# Patient Record
Sex: FEMALE | Race: BLACK OR AFRICAN AMERICAN | Employment: FULL TIME | URBAN - METROPOLITAN AREA
[De-identification: names, ages, dates, MRNs, and addresses within clinical notes are randomized per-mention and may not be internally consistent; named-entity substitution may affect disease eponyms.]

---

## 2017-02-03 ENCOUNTER — OFFICE VISIT (OUTPATIENT)
Dept: INTERNAL MEDICINE CLINIC | Age: 37
End: 2017-02-03

## 2017-02-03 VITALS
WEIGHT: 201 LBS | RESPIRATION RATE: 16 BRPM | TEMPERATURE: 98.1 F | OXYGEN SATURATION: 98 % | BODY MASS INDEX: 34.31 KG/M2 | HEIGHT: 64 IN | HEART RATE: 72 BPM | SYSTOLIC BLOOD PRESSURE: 106 MMHG | DIASTOLIC BLOOD PRESSURE: 74 MMHG

## 2017-02-03 DIAGNOSIS — R51.9 HEADACHE, UNSPECIFIED HEADACHE TYPE: Primary | ICD-10-CM

## 2017-02-03 DIAGNOSIS — E66.9 OBESITY (BMI 30.0-34.9): ICD-10-CM

## 2017-02-03 DIAGNOSIS — K58.1 IRRITABLE BOWEL SYNDROME WITH CONSTIPATION: ICD-10-CM

## 2017-02-03 DIAGNOSIS — R53.83 FATIGUE, UNSPECIFIED TYPE: ICD-10-CM

## 2017-02-03 NOTE — PROGRESS NOTES
Ridge Bower is a 40 y.o. female who was seen in clinic today (2/3/2017). Patient was seen with Dr Itz Osuna (R3 at [facility]). Assessment & Plan:  Shakira was seen today for headache and abdominal pain. Diagnoses and all orders for this visit:    Headache, unspecified headache type- differential dx reviewed with the patient, sounds more allergy related. Will treat with restarting prn allergy medications. Will avoid NSAIDs and use only Tylenol. Red flags were reviewed with the patient to RTC or notify me, expected time course for resolution reviewed, instructed to update me in 2 wks. Fatigue, unspecified type- this is a new problem, differential dx reviewed with the patient, no obvious etiology. Will check labs. Will consider getting sleep study   -     METABOLIC PANEL, COMPREHENSIVE  -     CBC WITH AUTOMATED DIFF  -     TSH 3RD GENERATION    Irritable bowel syndrome with constipation- fluctuating, reviewed FODMAT diet again. Reviewed fiber daily. To GI if no changes. Obesity (BMI 30.0-34.9)- stable, needs improvement, discussed the patient's above normal BMI with her. Will check labs. The follow up plan is as follows: I have counseled this patient on diet and exercise regimens, cont to exercise & recommended to start monitoring calorie intake. Follow-up Disposition:  Return if symptoms worsen or fail to improve.       ----------------------------------------------------------------------    Subjective:  Neurological Review  She is here today to talk about headaches & fatigue. This is a new problem. Episodes are occuring daily for the last the last month. The pain is described aching & is bilateral in the frontal area. Associated symptoms include: none. Precipitating factors are: when she gets hungry. She denies a history of recent head injury. Treatments for headaches currently include: OTC NSAID's. Her fatigue has been present for \"a while\". She reports snoring, but no apnea. She feels she is sleeping well at night. Not needing to nap. Prior to Admission medications    Medication Sig Start Date End Date Taking? Authorizing Provider   albuterol (PROVENTIL HFA, VENTOLIN HFA, PROAIR HFA) 90 mcg/actuation inhaler Take 1 Puff by inhalation as needed for Wheezing. 11/11/16  Yes Prince Jhoana MD   montelukast (SINGULAIR) 10 mg tablet TAKE 1 TABLET BY MOUTH EVERY DAY 8/4/16  Yes Prince Jhoana MD   azelastine (ASTELIN) 137 mcg nasal spray 2 Sprays by Both Nostrils route two (2) times a day. Use in each nostril as directed 4/24/15  Yes Prince Jhoana MD   MULTIVIT WITH CALCIUM,IRON,MIN [facility] MULTIPLE VITAMINS PO) Take 1 Tab by mouth daily. Yes Historical Provider   fluticasone (FLONASE) 50 mcg/actuation nasal spray 2 sprays by Both Nostrils route daily. 12/15/14  Yes Prince Jhoana MD   levonorgestrel (MIRENA) 20 mcg/24 hour (5 years) IUD 1 Each by IntraUTERine route once. Yes Historical Provider   loratadine (CLARITIN) 10 mg tablet Take 10 mg by mouth daily. Indications: ALLERGIC RHINITIS   Yes Historical Provider   omeprazole (PRILOSEC) 20 mg capsule Take 1 Cap by mouth daily. 3/7/16   Prince Jhoana MD          No Known Allergies        Review of Systems   Constitutional: Positive for malaise/fatigue. Negative for weight loss. Respiratory: Negative for cough and shortness of breath. Cardiovascular: Negative for chest pain and palpitations. Gastrointestinal: Positive for abdominal pain (cramping) and constipation. Negative for diarrhea, nausea and vomiting. Never started FODMAT diet   Neurological: Positive for headaches. Objective:   Physical Exam   Constitutional: No distress. obese   HENT:   Right Ear: Tympanic membrane is not erythematous and not bulging. No middle ear effusion. Left Ear: Tympanic membrane is not erythematous and not bulging. No middle ear effusion. Nose: No mucosal edema or rhinorrhea.  Right sinus exhibits no maxillary sinus tenderness and no frontal sinus tenderness. Left sinus exhibits no maxillary sinus tenderness and no frontal sinus tenderness. Mouth/Throat: Uvula is midline and mucous membranes are normal. No oropharyngeal exudate or posterior oropharyngeal erythema. Eyes: Conjunctivae are normal. No scleral icterus. Neck: Neck supple. Cardiovascular: Regular rhythm and normal heart sounds. No murmur heard. Pulmonary/Chest: Effort normal and breath sounds normal. She has no wheezes. She has no rales. Abdominal: Bowel sounds are normal. She exhibits no mass. There is no hepatosplenomegaly. There is no tenderness. Lymphadenopathy:     She has no cervical adenopathy. Psychiatric: She has a normal mood and affect. Her behavior is normal.         Visit Vitals    /74    Pulse 72    Temp 98.1 °F (36.7 °C) (Oral)    Resp 16    Ht 5' 4\" (1.626 m)    Wt 201 lb (91.2 kg)    SpO2 98%    BMI 34.5 kg/m2         Disclaimer:  Advised her to call back or return to office if symptoms worsen/change/persist.  Discussed expected course/resolution/complications of diagnosis in detail with patient. Medication risks/benefits/costs/interactions/alternatives discussed with patient. She was given an after visit summary which includes diagnoses, current medications, & vitals. She expressed understanding with the diagnosis and plan.         Liana Johns MD

## 2017-02-04 LAB
ALBUMIN SERPL-MCNC: 4 G/DL (ref 3.5–5.5)
ALBUMIN/GLOB SERPL: 1.7 {RATIO} (ref 1.1–2.5)
ALP SERPL-CCNC: 82 IU/L (ref 39–117)
ALT SERPL-CCNC: 22 IU/L (ref 0–32)
AST SERPL-CCNC: 17 IU/L (ref 0–40)
BASOPHILS # BLD AUTO: 0 X10E3/UL (ref 0–0.2)
BASOPHILS NFR BLD AUTO: 0 %
BILIRUB SERPL-MCNC: 0.6 MG/DL (ref 0–1.2)
BUN SERPL-MCNC: 9 MG/DL (ref 6–20)
BUN/CREAT SERPL: 14 (ref 8–20)
CALCIUM SERPL-MCNC: 9.4 MG/DL (ref 8.7–10.2)
CHLORIDE SERPL-SCNC: 103 MMOL/L (ref 96–106)
CO2 SERPL-SCNC: 28 MMOL/L (ref 18–29)
CREAT SERPL-MCNC: 0.65 MG/DL (ref 0.57–1)
EOSINOPHIL # BLD AUTO: 0.2 X10E3/UL (ref 0–0.4)
EOSINOPHIL NFR BLD AUTO: 3 %
ERYTHROCYTE [DISTWIDTH] IN BLOOD BY AUTOMATED COUNT: 13.3 % (ref 12.3–15.4)
GLOBULIN SER CALC-MCNC: 2.4 G/DL (ref 1.5–4.5)
GLUCOSE SERPL-MCNC: 88 MG/DL (ref 65–99)
HCT VFR BLD AUTO: 39 % (ref 34–46.6)
HGB BLD-MCNC: 12.7 G/DL (ref 11.1–15.9)
IMM GRANULOCYTES # BLD: 0 X10E3/UL (ref 0–0.1)
IMM GRANULOCYTES NFR BLD: 0 %
LYMPHOCYTES # BLD AUTO: 3.7 X10E3/UL (ref 0.7–3.1)
LYMPHOCYTES NFR BLD AUTO: 45 %
MCH RBC QN AUTO: 29.6 PG (ref 26.6–33)
MCHC RBC AUTO-ENTMCNC: 32.6 G/DL (ref 31.5–35.7)
MCV RBC AUTO: 91 FL (ref 79–97)
MONOCYTES # BLD AUTO: 0.8 X10E3/UL (ref 0.1–0.9)
MONOCYTES NFR BLD AUTO: 10 %
NEUTROPHILS # BLD AUTO: 3.5 X10E3/UL (ref 1.4–7)
NEUTROPHILS NFR BLD AUTO: 42 %
PLATELET # BLD AUTO: 293 X10E3/UL (ref 150–379)
POTASSIUM SERPL-SCNC: 4.1 MMOL/L (ref 3.5–5.2)
PROT SERPL-MCNC: 6.4 G/DL (ref 6–8.5)
RBC # BLD AUTO: 4.29 X10E6/UL (ref 3.77–5.28)
SODIUM SERPL-SCNC: 141 MMOL/L (ref 134–144)
TSH SERPL DL<=0.005 MIU/L-ACNC: 1.16 UIU/ML (ref 0.45–4.5)
WBC # BLD AUTO: 8.3 X10E3/UL (ref 3.4–10.8)

## 2017-02-06 NOTE — PROGRESS NOTES
Results released to patient via [employer]. All labs are stable or at goal for her. Anemia resolved. No obvious cause of fatigue.

## 2017-03-05 RX ORDER — MONTELUKAST SODIUM 10 MG/1
TABLET ORAL
Qty: 30 TAB | Refills: 5 | Status: SHIPPED | OUTPATIENT
Start: 2017-03-05 | End: 2017-05-19 | Stop reason: SDUPTHER

## 2017-04-14 DIAGNOSIS — J45.909 UNCOMPLICATED ASTHMA, UNSPECIFIED ASTHMA SEVERITY: ICD-10-CM

## 2017-04-14 DIAGNOSIS — J30.9 ALLERGIC RHINITIS, UNSPECIFIED ALLERGIC RHINITIS TRIGGER, UNSPECIFIED RHINITIS SEASONALITY: ICD-10-CM

## 2017-04-14 RX ORDER — FLUTICASONE PROPIONATE 50 MCG
2 SPRAY, SUSPENSION (ML) NASAL DAILY
Qty: 3 BOTTLE | Refills: 3 | Status: SHIPPED | OUTPATIENT
Start: 2017-04-14 | End: 2017-05-19 | Stop reason: SDUPTHER

## 2017-05-19 DIAGNOSIS — J30.9 ALLERGIC RHINITIS, UNSPECIFIED ALLERGIC RHINITIS TRIGGER, UNSPECIFIED RHINITIS SEASONALITY: ICD-10-CM

## 2017-05-19 DIAGNOSIS — J45.909 UNCOMPLICATED ASTHMA, UNSPECIFIED ASTHMA SEVERITY: ICD-10-CM

## 2017-05-24 RX ORDER — MONTELUKAST SODIUM 10 MG/1
10 TABLET ORAL DAILY
Qty: 30 TAB | Refills: 5 | Status: SHIPPED | COMMUNITY
Start: 2017-05-24 | End: 2017-06-15 | Stop reason: SDUPTHER

## 2017-05-24 RX ORDER — FLUTICASONE PROPIONATE 50 MCG
2 SPRAY, SUSPENSION (ML) NASAL DAILY
Qty: 3 BOTTLE | Refills: 3 | Status: SHIPPED | OUTPATIENT
Start: 2017-05-24 | End: 2018-03-28 | Stop reason: SDUPTHER

## 2017-05-24 NOTE — TELEPHONE ENCOUNTER
From: Maeve Aaron  To: Yen Verde MD  Sent: 5/19/2017 12:26 PM EDT  Subject: Medication Renewal Request    Original authorizing provider: MD Maeve Smith would like a refill of the following medications:  montelukast (SINGULAIR) 10 mg tablet [Kristopher Lima MD]  fluticasone (FLONASE) 50 mcg/actuation nasal spray [Kristopher Lima MD]    Preferred pharmacy: [facility]    Comment:  Requesting 90-day supply scripts for the above medications to be sent to [facility]. My insurance co is giving me a hard time about filling them at [facility] (higher cost). Thank you.

## 2017-06-15 RX ORDER — MONTELUKAST SODIUM 10 MG/1
10 TABLET ORAL DAILY
Qty: 90 TAB | Refills: 0 | Status: SHIPPED | COMMUNITY
Start: 2017-06-15 | End: 2018-03-28 | Stop reason: SDUPTHER

## 2017-07-18 ENCOUNTER — OFFICE VISIT (OUTPATIENT)
Dept: INTERNAL MEDICINE CLINIC | Age: 37
End: 2017-07-18

## 2017-07-18 VITALS
DIASTOLIC BLOOD PRESSURE: 74 MMHG | OXYGEN SATURATION: 99 % | HEART RATE: 64 BPM | RESPIRATION RATE: 12 BRPM | WEIGHT: 203 LBS | SYSTOLIC BLOOD PRESSURE: 104 MMHG | TEMPERATURE: 98.4 F | HEIGHT: 64 IN | BODY MASS INDEX: 34.66 KG/M2

## 2017-07-18 DIAGNOSIS — J45.20 MILD INTERMITTENT ASTHMA WITHOUT COMPLICATION: Primary | ICD-10-CM

## 2017-07-18 DIAGNOSIS — K58.2 IRRITABLE BOWEL SYNDROME WITH BOTH CONSTIPATION AND DIARRHEA: ICD-10-CM

## 2017-07-18 DIAGNOSIS — J30.2 SEASONAL ALLERGIC RHINITIS, UNSPECIFIED ALLERGIC RHINITIS TRIGGER: ICD-10-CM

## 2017-07-18 DIAGNOSIS — E66.09 NON MORBID OBESITY DUE TO EXCESS CALORIES: ICD-10-CM

## 2017-07-18 DIAGNOSIS — K21.9 GASTROESOPHAGEAL REFLUX DISEASE, ESOPHAGITIS PRESENCE NOT SPECIFIED: ICD-10-CM

## 2017-07-18 NOTE — PROGRESS NOTES
Flora Leblanc is a 40 y.o. female who was seen in clinic today (7/18/2017). Assessment & Plan:  Diagnoses and all orders for this visit:    1. Mild intermittent asthma without complication- well controlled, continue current treatment     2. Gastroesophageal reflux disease, esophagitis presence not specified- stable, continue current treatment as long as prn    3. Seasonal allergic rhinitis, unspecified allergic rhinitis trigger- well controlled, continue current treatment, can add back Astelin if gets worse    4. Irritable bowel syndrome with both constipation and diarrhea- stable, continue current treatment     5. Non morbid obesity due to excess calories- stable, needs improvement, I have reviewed/discussed the above normal BMI with the patient. I have recommended the following interventions: encourage exercise and lifestyle education regarding diet. Follow-up Disposition:  Return for Regular follow up.       ----------------------------------------------------------------------    Subjective:  Shakira was seen today for GERD; Asthma; and Allergic Rhinitis    Pulmonary Review  The patient is being seen for asthma & allergies. Symptoms occur: infrequently. Current limitations in activity: none. Coughing when present is described as none. Wheezing when present is described as none. Regimen compliance: using meds regularly (singulair). Frequency of rescue inhaler: has not used since last visit. She is not using Astelin, but using other allergy medications. Slight sore throat, no post nasal drip sensation though. Patient does not smoke cigarettes. She reports her allergy symptoms are improved.       GI Review  Patient complains of GERD and IBS type symptoms. This has been present for years ago, about the same since last visit. Her symptoms include abdominal bloating, fullness after meals, nausea and alternating diarrhea/constipation. She denies melena or brbpr.   Medical therapy currently involves Prilosec (using as needed, will use for a few days then stop, using every 2-3 months).    ----------------------------------------------------------------------      Prior to Admission medications    Medication Sig Start Date End Date Taking? Authorizing Provider   guar gum (BENEFIBER) packet Take  by mouth two (2) times daily as needed. Yes Historical Provider   montelukast (SINGULAIR) 10 mg tablet Take 1 Tab by mouth daily. 6/15/17  Yes Zo Roy MD   fluticasone [facility]) 50 mcg/actuation nasal spray 2 Sprays by Both Nostrils route daily. 5/24/17  Yes Zo Roy MD   albuterol (PROVENTIL HFA, VENTOLIN HFA, PROAIR HFA) 90 mcg/actuation inhaler Take 1 Puff by inhalation as needed for Wheezing. 11/11/16  Yes Zo Roy MD   omeprazole (PRILOSEC) 20 mg capsule Take 1 Cap by mouth daily. 3/7/16  Yes Zo Roy MD   MULTIVIT WITH CALCIUM,IRON,MIN [facility] MULTIPLE VITAMINS PO) Take 1 Tab by mouth daily. Yes Historical Provider   levonorgestrel (MIRENA) 20 mcg/24 hour (5 years) IUD 1 Each by IntraUTERine route once. Yes Historical Provider   loratadine (CLARITIN) 10 mg tablet Take 10 mg by mouth daily. Indications: ALLERGIC RHINITIS   Yes Historical Provider   azelastine (ASTELIN) 137 mcg nasal spray 2 Sprays by Both Nostrils route two (2) times a day. Use in each nostril as directed 4/24/15   Zo Roy MD          No Known Allergies        Review of Systems   Respiratory: Negative for cough and shortness of breath. Cardiovascular: Negative for chest pain and palpitations. Gastrointestinal: Positive for constipation, diarrhea and nausea. Negative for abdominal pain, blood in stool, melena and vomiting. Musculoskeletal: Negative for joint pain and myalgias. Psychiatric/Behavioral: Negative for depression. The patient is not nervous/anxious and does not have insomnia. Objective:   Physical Exam   Constitutional: No distress. Cardiovascular: Regular rhythm and normal heart sounds. No murmur heard. Pulmonary/Chest: Effort normal and breath sounds normal. She has no wheezes. She has no rales. Abdominal: Bowel sounds are normal. She exhibits no mass. There is no hepatosplenomegaly. There is no tenderness. Musculoskeletal: She exhibits no edema. Psychiatric: She has a normal mood and affect. Her behavior is normal.         Visit Vitals    /74    Pulse 64    Temp 98.4 °F (36.9 °C) (Oral)    Resp 12    Ht 5' 4\" (1.626 m)    Wt 203 lb (92.1 kg)    SpO2 99%    BMI 34.84 kg/m2         Disclaimer:  Advised her to call back or return to office if symptoms worsen/change/persist.  Discussed expected course/resolution/complications of diagnosis in detail with patient. Medication risks/benefits/costs/interactions/alternatives discussed with patient. She was given an after visit summary which includes diagnoses, current medications, & vitals. She expressed understanding with the diagnosis and plan.         Nereyda Julien MD

## 2017-08-01 ENCOUNTER — OFFICE VISIT (OUTPATIENT)
Dept: INTERNAL MEDICINE CLINIC | Age: 37
End: 2017-08-01

## 2017-08-01 VITALS
DIASTOLIC BLOOD PRESSURE: 78 MMHG | RESPIRATION RATE: 16 BRPM | WEIGHT: 202.2 LBS | SYSTOLIC BLOOD PRESSURE: 116 MMHG | HEIGHT: 64 IN | TEMPERATURE: 98.2 F | BODY MASS INDEX: 34.52 KG/M2 | OXYGEN SATURATION: 98 % | HEART RATE: 87 BPM

## 2017-08-01 DIAGNOSIS — J06.9 URI WITH COUGH AND CONGESTION: Primary | ICD-10-CM

## 2017-08-01 DIAGNOSIS — J02.9 SORE THROAT: ICD-10-CM

## 2017-08-01 DIAGNOSIS — B37.9 YEAST INFECTION: ICD-10-CM

## 2017-08-01 LAB
S PYO AG THROAT QL: NEGATIVE
VALID INTERNAL CONTROL?: YES

## 2017-08-01 RX ORDER — AZITHROMYCIN 250 MG/1
TABLET, FILM COATED ORAL
Qty: 6 TAB | Refills: 0 | Status: SHIPPED | OUTPATIENT
Start: 2017-08-01 | End: 2017-10-05 | Stop reason: ALTCHOICE

## 2017-08-01 RX ORDER — NYSTATIN AND TRIAMCINOLONE ACETONIDE 100000; 1 [USP'U]/G; MG/G
OINTMENT TOPICAL 2 TIMES DAILY
Qty: 30 G | Refills: 0 | Status: SHIPPED | OUTPATIENT
Start: 2017-08-01 | End: 2017-08-08

## 2017-08-01 RX ORDER — FLUCONAZOLE 150 MG/1
150 TABLET ORAL DAILY
Qty: 1 TAB | Refills: 0 | Status: SHIPPED | OUTPATIENT
Start: 2017-08-01 | End: 2017-10-05 | Stop reason: SDUPTHER

## 2017-08-01 NOTE — PROGRESS NOTES
Chief Complaint   Patient presents with    Sore Throat     1. Have you been to the ER, urgent care clinic since your last visit? Hospitalized since your last visit? No    2. Have you seen or consulted any other health care providers outside of the [address] [place] [address] since your last visit? Include any pap smears or colon screening.  Yes When: 3/2017 [facility] Reason for visit: Routine Follow Up    Patient states sore throat, 6 days

## 2017-08-01 NOTE — PROGRESS NOTES
HISTORY OF PRESENT ILLNESS  Shakira Birmingham is a 40 y.o. female. Cold Symptoms   The current episode started more than 2 days ago (5 days ). The cough is non-productive. There has been no fever. Associated symptoms include headaches (dull frontal ), rhinorrhea (thick green ) and sore throat. Pertinent negatives include no chills, no ear pain, no shortness of breath, no wheezing, no nausea and no vomiting. Treatments tried: Tea and increased flonase, aleve. The treatment provided mild relief. She is not a smoker. Her past medical history is significant for asthma. Her past medical history does not include bronchitis. Daughter has similar sx     Review of Systems   Constitutional: Negative for chills. HENT: Positive for rhinorrhea (thick green ) and sore throat. Negative for ear pain. Respiratory: Negative for shortness of breath and wheezing. Gastrointestinal: Negative for nausea and vomiting. Neurological: Positive for headaches (dull frontal ). Patient Active Problem List    Diagnosis Date Noted    Irritable bowel syndrome with both constipation and diarrhea 11/11/2016    Obesity     Hyperlipidemia 11/11/2011    DDD (degenerative disc disease), lumbar 04/13/2011    Asthma 04/13/2011    GERD (gastroesophageal reflux disease) 04/13/2011    Seasonal allergic rhinitis 04/13/2011       Current Outpatient Prescriptions   Medication Sig Dispense Refill    guar gum (BENEFIBER) packet Take  by mouth two (2) times daily as needed.  montelukast (SINGULAIR) 10 mg tablet Take 1 Tab by mouth daily. 90 Tab 0    fluticasone (FLONASE) 50 mcg/actuation nasal spray 2 Sprays by Both Nostrils route daily. 3 Bottle 3    albuterol (PROVENTIL HFA, VENTOLIN HFA, PROAIR HFA) 90 mcg/actuation inhaler Take 1 Puff by inhalation as needed for Wheezing. 1 Inhaler 0    omeprazole (PRILOSEC) 20 mg capsule Take 1 Cap by mouth daily.  30 Cap 1    MULTIVIT WITH CALCIUM,IRON,MIN [facility] MULTIPLE VITAMINS PO) Take 1 Tab by mouth daily.  levonorgestrel (MIRENA) 20 mcg/24 hour (5 years) IUD 1 Each by IntraUTERine route once.  loratadine (CLARITIN) 10 mg tablet Take 10 mg by mouth daily. Indications: ALLERGIC RHINITIS         No Known Allergies   Visit Vitals    /78 (BP 1 Location: Right arm, BP Patient Position: Sitting)    Pulse 87    Temp 98.2 °F (36.8 °C) (Oral)    Resp 16    Ht 5' 4\" (1.626 m)    Wt 202 lb 3.2 oz (91.7 kg)    SpO2 98%    BMI 34.71 kg/m2         Physical Exam   Constitutional: She is oriented to person, place, and time. She appears well-developed. No distress. HENT:   Nose: Mucosal edema present. No rhinorrhea or septal deviation. Right sinus exhibits no maxillary sinus tenderness and no frontal sinus tenderness. Left sinus exhibits no maxillary sinus tenderness and no frontal sinus tenderness. Mouth/Throat: Posterior oropharyngeal edema and posterior oropharyngeal erythema present. No oropharyngeal exudate. Eyes: Conjunctivae are normal.   Neck: Neck supple. Cardiovascular: Normal rate, regular rhythm and normal heart sounds. Pulmonary/Chest: Effort normal and breath sounds normal. No respiratory distress. She has no wheezes. She has no rales. She exhibits no tenderness. Lymphadenopathy:     She has cervical adenopathy. Neurological: She is alert and oriented to person, place, and time. Psychiatric: She has a normal mood and affect. Recent Results (from the past 12 hour(s))   AMB POC RAPID STREP A    Collection Time: 08/01/17  4:00 PM   Result Value Ref Range    VALID INTERNAL CONTROL POC Yes     Group A Strep Ag Negative Negative       ASSESSMENT and PLAN  Diagnoses and all orders for this visit:    1. URI with cough and congestion- 5 days of s/s with purulent drainage. Likely bacteria   -     azithromycin (ZITHROMAX) 250 mg tablet; Day 1 take 2 tabs by mouth; Days 2-5 take one tab by mouth a day    2. Sore throat  -     AMB POC RAPID STREP A    3.  Yeast infection- given for treatment after abx  -     fluconazole (DIFLUCAN) 150 mg tablet; Take 1 Tab by mouth daily for 1 day. Start after antibiotics if yeast infection present  -     nystatin-triamcinolone (MYCOLOG) 100,000-0.1 unit/gram-% ointment; Apply  to affected area two (2) times a day for 7 days. Follow-up Disposition:  Return if symptoms worsen or fail to improve. Medication risks/benefits/costs/interactions/alternatives discussed with patient. Danielle Chica  was given an after visit summary which includes diagnoses, current medications, & vitals. she expressed understanding with the diagnosis and plan.

## 2017-08-01 NOTE — PATIENT INSTRUCTIONS
It was a pleasure to see you! As discussed:    Upper respiratory tract infection (URI)  You have a bacterial URI. Take the antibiotics as prescribed. -Use Flonase 1-2 sprays per nostril once a day  -Use nasal saline spray 3-4 times/day BEFORE Flonase  -Start taking a non sedating antihistamine such as Claritin, Allegra or Zyrtec (generic is fine)  For your  Symptoms:  -Sore throat: Cepacol or similar lozenges, Salt water gargles  -Itching: Thick creams/ lotions; Nonsedating antihistamine such as Allegra, Zyrtec, or Claritin (generic is fine)  -Pain/ Aches: You can use Ibuprofen (no more than 2400 mg/day) or Aleve (no more than 880mg/ day) as needed. Do not use any other NSAIDs while on this medication. Do not use NSAIDs if you have high blood pressure or history of ulcers or abnormal bleeding. OR   -Take Tylenol as needed for headache and body aches (every 4-8 hours, do not use more than 3000mg in one day)       Upper Respiratory Infection (Cold): Care Instructions  Your Care Instructions    An upper respiratory infection, or URI, is an infection of the nose, sinuses, or throat. URIs are spread by coughs, sneezes, and direct contact. The common cold is the most frequent kind of URI. The flu and sinus infections are other kinds of URIs. Almost all URIs are caused by viruses. Antibiotics won't cure them. But you can treat most infections with home care. This may include drinking lots of fluids and taking over-the-counter pain medicine. You will probably feel better in 4 to 10 days. The doctor has checked you carefully, but problems can develop later. If you notice any problems or new symptoms, get medical treatment right away. Follow-up care is a key part of your treatment and safety. Be sure to make and go to all appointments, and call your doctor if you are having problems. It's also a good idea to know your test results and keep a list of the medicines you take.   How can you care for yourself at home?  · To prevent dehydration, drink plenty of fluids, enough so that your urine is light yellow or clear like water. Choose water and other caffeine-free clear liquids until you feel better. If you have kidney, heart, or liver disease and have to limit fluids, talk with your doctor before you increase the amount of fluids you drink. · Take an over-the-counter pain medicine, such as acetaminophen (Tylenol), ibuprofen (Advil, Motrin), or naproxen (Aleve). Read and follow all instructions on the label. · Before you use cough and cold medicines, check the label. These medicines may not be safe for young children or for people with certain health problems. · Be careful when taking over-the-counter cold or flu medicines and Tylenol at the same time. Many of these medicines have acetaminophen, which is Tylenol. Read the labels to make sure that you are not taking more than the recommended dose. Too much acetaminophen (Tylenol) can be harmful. · Get plenty of rest.  · Do not smoke or allow others to smoke around you. If you need help quitting, talk to your doctor about stop-smoking programs and medicines. These can increase your chances of quitting for good. When should you call for help? Call 911 anytime you think you may need emergency care. For example, call if:  · You have severe trouble breathing. Call your doctor now or seek immediate medical care if:  · You seem to be getting much sicker. · You have new or worse trouble breathing. · You have a new or higher fever. · You have a new rash. Watch closely for changes in your health, and be sure to contact your doctor if:  · You have a new symptom, such as a sore throat, an earache, or sinus pain. · You cough more deeply or more often, especially if you notice more mucus or a change in the color of your mucus. · You do not get better as expected. Where can you learn more? Go to http://flavia-[employer].info/.   Enter B833 in the search box to learn more about \"Upper Respiratory Infection (Cold): Care Instructions. \"  Current as of: March 25, 2017  Content Version: 11.3  © 8807-3008 [employer]. Care instructions adapted under license by [employer] (which disclaims liability or warranty for this information). If you have questions about a medical condition or this instruction, always ask your healthcare professional. [place] [address] any warranty or liability for your use of this information.

## 2017-10-04 ENCOUNTER — TELEPHONE (OUTPATIENT)
Dept: INTERNAL MEDICINE CLINIC | Age: 37
End: 2017-10-04

## 2017-10-04 DIAGNOSIS — B37.9 YEAST INFECTION: ICD-10-CM

## 2017-10-05 RX ORDER — FLUCONAZOLE 150 MG/1
150 TABLET ORAL DAILY
Qty: 1 TAB | Refills: 0 | Status: SHIPPED | OUTPATIENT
Start: 2017-10-05 | End: 2017-10-06

## 2017-10-05 NOTE — TELEPHONE ENCOUNTER
Pt states she is having another yeast infection and is requesting the Fluconazole for the yeast infection that Dr Howard Mota prescribed for her before.

## 2017-10-06 NOTE — TELEPHONE ENCOUNTER
Called pt and informed her medication for yeast infection has been filled and if pt has again will have to follow up with obgyn. Left pt this detailed message on voicemail.

## 2018-03-28 ENCOUNTER — OFFICE VISIT (OUTPATIENT)
Dept: INTERNAL MEDICINE CLINIC | Age: 38
End: 2018-03-28

## 2018-03-28 VITALS
DIASTOLIC BLOOD PRESSURE: 84 MMHG | HEART RATE: 64 BPM | RESPIRATION RATE: 14 BRPM | BODY MASS INDEX: 35.17 KG/M2 | HEIGHT: 64 IN | WEIGHT: 206 LBS | SYSTOLIC BLOOD PRESSURE: 114 MMHG | TEMPERATURE: 99.2 F | OXYGEN SATURATION: 99 %

## 2018-03-28 DIAGNOSIS — K21.9 GASTROESOPHAGEAL REFLUX DISEASE WITHOUT ESOPHAGITIS: ICD-10-CM

## 2018-03-28 DIAGNOSIS — J30.2 CHRONIC SEASONAL ALLERGIC RHINITIS, UNSPECIFIED TRIGGER: ICD-10-CM

## 2018-03-28 DIAGNOSIS — M54.6 ACUTE MIDLINE THORACIC BACK PAIN: Primary | ICD-10-CM

## 2018-03-28 DIAGNOSIS — E66.01 SEVERE OBESITY (BMI 35.0-39.9) WITH COMORBIDITY (HCC): ICD-10-CM

## 2018-03-28 RX ORDER — OMEPRAZOLE 20 MG/1
20 CAPSULE, DELAYED RELEASE ORAL DAILY
Qty: 90 CAP | Refills: 1 | Status: CANCELLED | COMMUNITY
Start: 2018-03-28

## 2018-03-28 RX ORDER — ALBUTEROL SULFATE 90 UG/1
1 AEROSOL, METERED RESPIRATORY (INHALATION) AS NEEDED
Qty: 3 INHALER | Refills: 1 | Status: SHIPPED | COMMUNITY
Start: 2018-03-28

## 2018-03-28 RX ORDER — FLUTICASONE PROPIONATE 50 MCG
2 SPRAY, SUSPENSION (ML) NASAL DAILY
Qty: 3 BOTTLE | Refills: 3 | Status: SHIPPED | COMMUNITY
Start: 2018-03-28

## 2018-03-28 RX ORDER — MONTELUKAST SODIUM 10 MG/1
10 TABLET ORAL DAILY
Qty: 90 TAB | Refills: 3 | Status: SHIPPED | COMMUNITY
Start: 2018-03-28

## 2018-03-28 NOTE — PATIENT INSTRUCTIONS
Heat: apply heating pad 10-15 minutes at a time 3-4 times per day    Medications:  Ibuprofen/Advil: 200mg (1-3 tabs every 4-6 hours, take with food)        OR  Naproxen/Aleve: 220mg (1-2 tabs every 12 hours, take with food)     DO NOT TAKE ALONG WITH MELOXICAM!!    *Can not take these medications together. *You can take Tylenol 500mg (1 tabs every 6 hours, max dose of acetaminophen in 24 hrs is 3,000mg) with either Advil or Aleve    Stretching:  Start stretching/exercises (see below). Try to do this 1-2 times per day as tolerated. Healthy Upper Back: Exercises  Your Care Instructions  Here are some examples of exercises for your upper back. Start each exercise slowly. Ease off the exercise if you start to have pain. Your doctor or physical therapist will tell you when you can start these exercises and which ones will work best for you. How to do the exercises  Lower neck and upper back stretch    1. Stretch your arms out in front of your body. Clasp one hand on top of your other hand. 2. Gently reach out so that you feel your shoulder blades stretching away from each other. 3. Gently bend your head forward. 4. Hold for 15 to 30 seconds. 5. Repeat 2 to 4 times. Midback stretch    If you have knee pain, do not do this exercise. 1. Kneel on the floor, and sit back on your ankles. 2. Lean forward, place your hands on the floor, and stretch your arms out in front of you. Rest your head between your arms. 3. Gently push your chest toward the floor, reaching as far in front of you as possible. 4. Hold for 15 to 30 seconds. 5. Repeat 2 to 4 times. Shoulder rolls    1. Sit comfortably with your feet shoulder-width apart. You can also do this exercise while standing. 2. Roll your shoulders up, then back, and then down in a smooth, circular motion. 3. Repeat 2 to 4 times. Wall push-up    1. Stand against a wall with your feet about 12 to 24 inches back from the wall.  If you feel any pain when you do this exercise, stand closer to the wall. 2. Place your hands on the wall slightly wider apart than your shoulders, and lean forward. 3. Gently lean your body toward the wall. Then push back to your starting position. Keep the motion smooth and controlled. 4. Repeat 8 to 12 times. Resisted shoulder blade squeeze    For this exercise, you will need elastic exercise material, such as surgical tubing or Thera-Band. 1. Sit or stand, holding the band in both hands in front of you. Keep your elbows close to your sides, bent at a 90-degree angle. Your palms should face up. 2. Squeeze your shoulder blades together, and move your arms to the outside, stretching the band. Be sure to keep your elbows at your sides while you do this. 3. Relax. 4. Repeat 8 to 12 times. Resisted rows    For this exercise, you will need elastic exercise material, such as surgical tubing or Thera-Band. 1. Put the band around a solid object, such as a bedpost, at about waist level. Hold one end of the band in each hand. 2. With your elbows at your sides and bent to 90 degrees, pull the band back to move your shoulder blades toward each other. Return to the starting position. 3. Repeat 8 to 12 times. Follow-up care is a key part of your treatment and safety. Be sure to make and go to all appointments, and call your doctor if you are having problems. It's also a good idea to know your test results and keep a list of the medicines you take. Where can you learn more? Go to http://flavia-boston.info/. Enter V017 in the search box to learn more about \"Healthy Upper Back: Exercises. \"  Current as of: March 21, 2017  Content Version: 11.4  © 3895-7698 [employer]. Care instructions adapted under license by [employer] (which disclaims liability or warranty for this information).  If you have questions about a medical condition or this instruction, always ask your healthcare professional. [employer] disclaims any warranty or liability for your use of this information.

## 2018-03-28 NOTE — PROGRESS NOTES
Ayana Huston is a 41 y.o. female who was seen in clinic today (3/28/2018). Assessment & Plan:   Diagnoses and all orders for this visit:    1. Acute midline thoracic back pain- this is an acute on chronic problem, symptoms are: not changed, differential dx reviewed with the patient, favor muscular. Reassured does not sound cardiac, pulmonary, or GI related. Will treat with: heat, rest, stretching. Reviewed different NSAIDs as she thinks OTC meds work better then Rx. Reviewed when to see specialist.  See AVS, Red flags were reviewed with the patient to RTC or notify me, expected time course for resolution reviewed. 2. Chronic seasonal allergic rhinitis, unspecified trigger- stable, continue current treatment   -     montelukast (SINGULAIR) 10 mg tablet; Take 1 Tab by mouth daily. -     albuterol (PROVENTIL HFA, VENTOLIN HFA, PROAIR HFA) 90 mcg/actuation inhaler; Take 1 Puff by inhalation as needed for Wheezing.  -     fluticasone (FLONASE) 50 mcg/actuation nasal spray; 2 Sprays by Both Nostrils route daily. 3. Gastroesophageal reflux disease without esophagitis- well controlled, continue to monitor diet and reviewed when to consider restarting    4. Severe obesity (BMI 35.0-39. 9) with comorbidity ([address]- stable, needs improvement, I have reviewed/discussed the above normal BMI with the patient. I have recommended the following interventions: encourage exercise and lifestyle education regarding diet. Follow-up Disposition:  Return if symptoms worsen or fail to improve. Subjective:   Shakira was seen today for Asthma and Back Pain      She has Allergic Rhinitis that be consistant throughout the year. Current symptoms: nothing. Triggers: no triggers. She reports: taking medications as instructed, no medication side effects noted. No recent use of albuterol. She presents do to pain of her mid back pain that is secondary to no known injury and started 30 days ago.   She reports it is worse since last week, attributes to flying (either the seats or carrying the luggage). Back has been a chronic issue, has been on/off, but mostly doing okau until the last 1 month. She describes the pain as aching. It is constant but fluctuating in intensity. The pain radiates: no where. Exacerbating factors identifiable by patient are recumbency. She has tried ibuprofen until recently. She [facility] the [facility]. She reports getting a shot, a muscle relaxer (flexeril), and mobic 15mg. These have helped slightly, but she would expect more at this time. Prior to Admission medications    Medication Sig Start Date End Date Taking? Authorizing Provider   guar gum (BENEFIBER) packet Take  by mouth two (2) times daily as needed. Yes Historical Provider   montelukast (SINGULAIR) 10 mg tablet Take 1 Tab by mouth daily. 6/15/17  Yes Shereen Camacho MD   fluticasone [facility]) 50 mcg/actuation nasal spray 2 Sprays by Both Nostrils route daily. 5/24/17  Yes Shereen Camacho MD   omeprazole (PRILOSEC) 20 mg capsule Take 1 Cap by mouth daily. Patient taking differently: Take 20 mg by mouth daily as needed. 3/7/16  Yes Shereen Camacho MD   MULTIVIT WITH CALCIUM,IRON,MIN [facility] MULTIPLE VITAMINS PO) Take 1 Tab by mouth daily. Yes Historical Provider   levonorgestrel (MIRENA) 20 mcg/24 hour (5 years) IUD 1 Each by IntraUTERine route once. Yes Historical Provider   loratadine (CLARITIN) 10 mg tablet Take 10 mg by mouth daily. Indications: ALLERGIC RHINITIS   Yes Historical Provider   albuterol (PROVENTIL HFA, VENTOLIN HFA, PROAIR HFA) 90 mcg/actuation inhaler Take 1 Puff by inhalation as needed for Wheezing. 11/11/16   Shereen Camacho MD          No Known Allergies        Review of Systems   Constitutional: Negative for malaise/fatigue and weight loss. Respiratory: Negative for cough and shortness of breath.     Cardiovascular: Negative for chest pain, palpitations and leg swelling. Gastrointestinal: Negative for abdominal pain, constipation, diarrhea, heartburn, nausea and vomiting. Genitourinary: Negative for frequency. Musculoskeletal: Positive for back pain and myalgias. Negative for joint pain and neck pain. Skin: Negative for rash. Neurological: Negative for tingling, sensory change, focal weakness and headaches. Objective:   Physical Exam   Constitutional: No distress. Cardiovascular: Regular rhythm and normal heart sounds. No murmur heard. Pulmonary/Chest: Effort normal and breath sounds normal. She has no wheezes. She has no rales. Abdominal: Bowel sounds are normal. She exhibits no mass. There is no hepatosplenomegaly. There is no tenderness. Musculoskeletal:        Cervical back: She exhibits normal range of motion, no tenderness and no bony tenderness. Thoracic back: She exhibits tenderness. She exhibits normal range of motion (but increased pain on the L side w/ rotation to the R), no bony tenderness, no pain and no spasm. Back:    Skin: No rash noted. Psychiatric: She has a normal mood and affect. Visit Vitals    /84    Pulse 64    Temp 99.2 °F (37.3 °C) (Oral)    Resp 14    Ht 5' 4\" (1.626 m)    Wt 206 lb (93.4 kg)    SpO2 99%    BMI 35.36 kg/m2         Disclaimer:  Advised her to call back or return to office if symptoms worsen/change/persist.  Discussed expected course/resolution/complications of diagnosis in detail with patient. Medication risks/benefits/costs/interactions/alternatives discussed with patient. She was given an after visit summary which includes diagnoses, current medications, & vitals. She expressed understanding with the diagnosis and plan. Aspects of this note may have been generated using voice recognition software. Despite editing, there may be some syntax errors.        Lainey Okeefe MD

## 2018-03-28 NOTE — MR AVS SNAPSHOT
[address] [facility] [address] 
[phone] Patient: Jaron Marc MRN: [id number] TEF:[id number] Visit Information Date & Time Provider Department Dept. Phone Encounter #  
 3/28/2018  4:30 PM Diana Connor[facility] [phone] [id number] Follow-up Instructions Return if symptoms worsen or fail to improve. Your Appointments 7/6/2018  2:30 PM  
PHYSICAL with Diana Connor MD  
[facility] Internal Medicine [facility]) Appt Note: cpe  
 [address] [facility] [address]  
[address] [id number] [facility] [address] Upcoming Health Maintenance Date Due  
 PAP AKA CERVICAL CYTOLOGY 3/1/2017 DTaP/Tdap/Td series (2 - Td) 8/18/2020 Allergies as of 3/28/2018  Review Complete On: 3/28/2018 By: Diana Connor MD  
 No Known Allergies Current Immunizations  Reviewed on 3/28/2018 Name Date Hepatitis B Vaccine 11/3/2006 Influenza Vaccine 10/31/2017, 10/10/2016, 11/5/2015, 10/28/2014, 10/1/2013 MMR Vaccine 7/31/2000 PPD 4/13/2010 TDAP Vaccine 8/18/2010 Reviewed by Enrique Smith RN on 3/28/2018 at  4:30 PM  
You Were Diagnosed With   
  
 Codes Comments Acute midline thoracic back pain    -  Primary ICD-10-CM: M54.6 ICD-9-CM: 724.1 Gastroesophageal reflux disease without esophagitis     ICD-10-CM: K21.9 ICD-9-CM: 530.81 Chronic seasonal allergic rhinitis, unspecified trigger     ICD-10-CM: J30.2 ICD-9-CM: 477.8 Vitals BP Pulse Temp Resp Height(growth percentile) Weight(growth percentile) 114/84 64 99.2 °F (37.3 °C) (Oral) 14 5' 4\" (1.626 m) 206 lb (93.4 kg) SpO2 BMI OB Status Smoking Status 99% 35.36 kg/m2 IUD Never Smoker Vitals History BMI and BSA Data  Body Mass Index Body Surface Area  
 35.36 kg/m 2 2.05 m 2  
  
  
 Preferred Pharmacy Pharmacy Name Phone [facility] [phone] Your Updated Medication List  
  
   
This list is accurate as of 3/28/18  4:59 PM.  Always use your most recent med list.  
  
  
  
  
 albuterol 90 mcg/actuation inhaler Commonly known as:  PROVENTIL HFA, VENTOLIN HFA, PROAIR HFA Take 1 Puff by inhalation as needed for Wheezing. CLARITIN 10 mg tablet Generic drug:  loratadine Take 10 mg by mouth daily. Indications: ALLERGIC RHINITIS  
  
 fluticasone 50 mcg/actuation nasal spray Commonly known as:  Nathan Speller 2 Sprays by Both Nostrils route daily. guar gum packet Commonly known as:  Micaela Anibal Take  by mouth two (2) times daily as needed. MIRENA 20 mcg/24 hr (5 years) Iud  
Generic drug:  levonorgestrel 1 Each by IntraUTERine route once. montelukast 10 mg tablet Commonly known as:  SINGULAIR Take 1 Tab by mouth daily. omeprazole 20 mg capsule Commonly known as:  PRILOSEC Take 1 Cap by mouth daily. WOMEN'S MULTIPLE VITAMINS PO Take 1 Tab by mouth daily. Prescriptions Sent to Mail Order Refills  
 montelukast (SINGULAIR) 10 mg tablet 3 Sig: Take 1 Tab by mouth daily. Class: Mail Order Pharmacy: [address], [address] Ph #: [phone] Route: Oral  
 albuterol (PROVENTIL HFA, VENTOLIN HFA, PROAIR HFA) 90 mcg/actuation inhaler 1 Sig: Take 1 Puff by inhalation as needed for Wheezing. Class: Mail Order Pharmacy: [address], [address] Ph #: [phone] Route: Inhalation  
 fluticasone (FLONASE) 50 mcg/actuation nasal spray 3 Si Sprays by Both Nostrils route daily. Class: Mail Order Pharmacy: [address], [address] Ph #: [phone] Route: Both Nostrils Follow-up Instructions Return if symptoms worsen or fail to improve. Patient Instructions Heat: apply heating pad 10-15 minutes at a time 3-4 times per day Medications: 
Ibuprofen/Advil: 200mg (1-3 tabs every 4-6 hours, take with food) OR Naproxen/Aleve: 220mg (1-2 tabs every 12 hours, take with food) DO NOT TAKE ALONG WITH MELOXICAM!! 
 
*Can not take these medications together. *You can take Tylenol 500mg (1 tabs every 6 hours, max dose of acetaminophen in 24 hrs is 3,000mg) with either Advil or Aleve Stretching: 
Start stretching/exercises (see below). Try to do this 1-2 times per day as tolerated. Healthy Upper Back: Exercises Your Care Instructions Here are some examples of exercises for your upper back. Start each exercise slowly. Ease off the exercise if you start to have pain. Your doctor or physical therapist will tell you when you can start these exercises and which ones will work best for you. How to do the exercises Lower neck and upper back stretch 1. Stretch your arms out in front of your body. Clasp one hand on top of your other hand. 2. Gently reach out so that you feel your shoulder blades stretching away from each other. 3. Gently bend your head forward. 4. Hold for 15 to 30 seconds. 5. Repeat 2 to 4 times. Midback stretch If you have knee pain, do not do this exercise. 1. Kneel on the floor, and sit back on your ankles. 2. Lean forward, place your hands on the floor, and stretch your arms out in front of you. Rest your head between your arms. 3. Gently push your chest toward the floor, reaching as far in front of you as possible. 4. Hold for 15 to 30 seconds. 5. Repeat 2 to 4 times. Shoulder rolls 1. Sit comfortably with your feet shoulder-width apart. You can also do this exercise while standing. 2. Roll your shoulders up, then back, and then down in a smooth, circular motion. 3. Repeat 2 to 4 times. Wall push-up 1. Stand against a wall with your feet about 12 to 24 inches back from the wall. If you feel any pain when you do this exercise, stand closer to the wall. 2. Place your hands on the wall slightly wider apart than your shoulders, and lean forward. 3. Gently lean your body toward the wall. Then push back to your starting position. Keep the motion smooth and controlled. 4. Repeat 8 to 12 times. Resisted shoulder blade squeeze For this exercise, you will need elastic exercise material, such as surgical tubing or Thera-Band. 1. Sit or stand, holding the band in both hands in front of you. Keep your elbows close to your sides, bent at a 90-degree angle. Your palms should face up. 2. Squeeze your shoulder blades together, and move your arms to the outside, stretching the band. Be sure to keep your elbows at your sides while you do this. 3. Relax. 4. Repeat 8 to 12 times. Resisted rows For this exercise, you will need elastic exercise material, such as surgical tubing or Thera-Band. 1. Put the band around a solid object, such as a bedpost, at about waist level. Hold one end of the band in each hand. 2. With your elbows at your sides and bent to 90 degrees, pull the band back to move your shoulder blades toward each other. Return to the starting position. 3. Repeat 8 to 12 times. Follow-up care is a key part of your treatment and safety. Be sure to make and go to all appointments, and call your doctor if you are having problems. It's also a good idea to know your test results and keep a list of the medicines you take. Where can you learn more? Go to http://flavia-boston.info/. Enter V447 in the search box to learn more about \"Healthy Upper Back: Exercises. \" Current as of: March 21, 2017 Content Version: 11.4 © 0699-2353 [employer].  Care instructions adapted under license by [employer] (which disclaims liability or warranty for this information). If you have questions about a medical condition or this instruction, always ask your healthcare professional. [address] any warranty or liability for your use of this information. Introducing [facility]! Dear Jung Paez: Thank you for requesting a HeyKiki account. Our records indicate that you already have an active HeyKiki account. You can access your account anytime at [email or web address] Did you know that you can access your hospital and ER discharge instructions at any time in HeyKiki? You can also review all of your test results from your hospital stay or ER visit. Additional Information If you have questions, please visit the Frequently Asked Questions section of the HeyKiki website at [email or web address] Remember, HeyKiki is NOT to be used for urgent needs. For medical emergencies, dial 911. Now available from your iPhone and Android! Please provide this summary of care documentation to your next provider. Your primary care clinician is listed as Angi Hernandez. If you have any questions after today's visit, please call [phone].

## 2018-07-06 ENCOUNTER — OFFICE VISIT (OUTPATIENT)
Dept: INTERNAL MEDICINE CLINIC | Age: 38
End: 2018-07-06

## 2018-07-06 VITALS
SYSTOLIC BLOOD PRESSURE: 112 MMHG | OXYGEN SATURATION: 98 % | HEIGHT: 65 IN | WEIGHT: 208 LBS | HEART RATE: 64 BPM | BODY MASS INDEX: 34.66 KG/M2 | TEMPERATURE: 98.4 F | DIASTOLIC BLOOD PRESSURE: 70 MMHG | RESPIRATION RATE: 12 BRPM

## 2018-07-06 DIAGNOSIS — E66.01 SEVERE OBESITY (BMI 35.0-39.9) WITH COMORBIDITY (HCC): ICD-10-CM

## 2018-07-06 DIAGNOSIS — K21.9 GASTROESOPHAGEAL REFLUX DISEASE WITHOUT ESOPHAGITIS: ICD-10-CM

## 2018-07-06 DIAGNOSIS — J30.2 SEASONAL ALLERGIC RHINITIS, UNSPECIFIED TRIGGER: ICD-10-CM

## 2018-07-06 DIAGNOSIS — J45.20 MILD INTERMITTENT ASTHMA WITHOUT COMPLICATION: ICD-10-CM

## 2018-07-06 DIAGNOSIS — Z00.00 ROUTINE PHYSICAL EXAMINATION: Primary | ICD-10-CM

## 2018-07-06 RX ORDER — OMEPRAZOLE 20 MG/1
20 CAPSULE, DELAYED RELEASE ORAL DAILY
Qty: 90 CAP | Refills: 1 | Status: SHIPPED | COMMUNITY
Start: 2018-07-06

## 2018-07-06 NOTE — PATIENT INSTRUCTIONS

## 2018-07-06 NOTE — PROGRESS NOTES
Ankush Vaz is a 41 y.o. female who was seen in clinic today (7/6/2018) for a full physical.        Assessment & Plan:   Diagnoses and all orders for this visit:    1. Routine physical examination       -     Previous labs reviewed & discussed with her, will defer repeat       -     Weight is stable & above goal, I have reviewed/discussed the above normal BMI with the patient. I have recommended the following interventions: encourage exercise and lifestyle education regarding diet. 2. Gastroesophageal reflux disease without esophagitis- well controlled, using meds prn, reviewed triggers, continue current treatment   -     omeprazole (PRILOSEC) 20 mg capsule; Take 1 Cap by mouth daily. 3. Mild intermittent asthma without complication- well controlled, continue current treatment which is prn use of meds, offered PNA vaccine but declined. 4. Seasonal allergic rhinitis, unspecified trigger- well controlled, continue current treatment          Follow-up Disposition:  Return in about 1 year (around 7/6/2019), or if symptoms worsen or fail to improve.        ------------------------------------------------------------------------------------------    Subjective:   Shakira is here today for a full physical.      Health Maintenance  Immunizations:    Influenza: she will plan on getting it this fall. Tetanus: up to date. Pneunomia: declined. Cancer screening:    Cervical: reviewed guidelines, UTD, done by OBGYN, records requested. Breast: n/a, reviewed guidelines      Patient Care Team:  Patricia Keller MD as PCP - General (Internal Medicine)  Lee Hamilton DO (Obstetrics & Gynecology)  Joshua Russell MD (Allergy)  Arcenio Dutton MD (Internal Medicine)         The following sections were reviewed & updated as appropriate: PMH, PSH, FH, and SH.     Patient Active Problem List   Diagnosis Code    DDD (degenerative disc disease), lumbar M51.36    Asthma J45.909    GERD (gastroesophageal reflux disease) K21.9    Seasonal allergic rhinitis J30.2    Hyperlipidemia E78.5    Irritable bowel syndrome with both constipation and diarrhea K58.2    Severe obesity (BMI 35.0-39. 9) with comorbidity ([facility] [address] E66.01          Prior to Admission medications    Medication Sig Start Date End Date Taking? Authorizing Provider   montelukast (SINGULAIR) 10 mg tablet Take 1 Tab by mouth daily. 3/28/18  Yes Clotilde Aguilera MD   albuterol (PROVENTIL HFA, VENTOLIN HFA, PROAIR HFA) 90 mcg/actuation inhaler Take 1 Puff by inhalation as needed for Wheezing. 3/28/18  Yes Clotilde Aguilera MD   fluticasone [facility]) 50 mcg/actuation nasal spray 2 Sprays by Both Nostrils route daily. 3/28/18  Yes Clotilde Aguilera MD   guar gum (BENEFIBER) packet Take  by mouth two (2) times daily as needed. Yes Historical Provider   omeprazole (PRILOSEC) 20 mg capsule Take 1 Cap by mouth daily. Patient taking differently: Take 20 mg by mouth daily as needed. 3/7/16  Yes Clotiled Aguilera MD   MULTIVIT WITH CALCIUM,IRON,MIN [facility] MULTIPLE VITAMINS PO) Take 1 Tab by mouth daily. Yes Historical Provider   levonorgestrel (MIRENA) 20 mcg/24 hour (5 years) IUD 1 Each by IntraUTERine route once. Yes Historical Provider   loratadine (CLARITIN) 10 mg tablet Take 10 mg by mouth daily. Indications: ALLERGIC RHINITIS   Yes Historical Provider          No Known Allergies           Review of Systems   Constitutional: Negative for chills and fever. Respiratory: Negative for cough and shortness of breath. Cardiovascular: Negative for chest pain and palpitations. Gastrointestinal: Negative for abdominal pain, blood in stool, constipation, diarrhea, heartburn, nausea and vomiting. Musculoskeletal: Positive for back pain (improving, seen at [facility], records reviewed). Negative for joint pain and myalgias. Neurological: Negative for tingling, focal weakness and headaches. Endo/Heme/Allergies: Does not bruise/bleed easily. Psychiatric/Behavioral: Negative for depression. The patient is not nervous/anxious and does not have insomnia. Objective:   Physical Exam   Constitutional: She appears well-developed. No distress. obese   HENT:   Right Ear: Tympanic membrane, external ear and ear canal normal.   Left Ear: Tympanic membrane, external ear and ear canal normal.   Nose: Nose normal.   Mouth/Throat: Uvula is midline, oropharynx is clear and moist and mucous membranes are normal. No posterior oropharyngeal erythema. Eyes: Conjunctivae and lids are normal. No scleral icterus. Neck: Neck supple. Carotid bruit is not present. No thyromegaly present. Cardiovascular: Regular rhythm and normal heart sounds. No murmur heard. Pulses:       Dorsalis pedis pulses are 2+ on the right side, and 2+ on the left side. Posterior tibial pulses are 2+ on the right side, and 2+ on the left side. Pulmonary/Chest: Effort normal and breath sounds normal. She has no wheezes. She has no rales. Abdominal: Soft. Bowel sounds are normal. She exhibits no mass. There is no hepatosplenomegaly. There is no tenderness. Musculoskeletal: Normal range of motion. She exhibits no edema. Cervical back: Normal.        Thoracic back: She exhibits no bony tenderness. Lumbar back: Normal.   Lymphadenopathy:     She has no cervical adenopathy. Neurological: She has normal strength. No sensory deficit. Skin: No rash noted. Psychiatric: She has a normal mood and affect. Her behavior is normal.          Visit Vitals    /70    Pulse 64    Temp 98.4 °F (36.9 °C) (Oral)    Resp 12    Ht 5' 4.6\" (1.641 m)    Wt 208 lb (94.3 kg)    SpO2 98%    BMI 35.04 kg/m2          Advised her to call back or return to office if symptoms worsen/change/persist.  Discussed expected course/resolution/complications of diagnosis in detail with patient.     Medication risks/benefits/costs/interactions/alternatives discussed with patient. She was given an after visit summary which includes diagnoses, current medications, & vitals. She expressed understanding with the diagnosis and plan. Aspects of this note may have been generated using voice recognition software. Despite editing, there may be some syntax errors.        Gene Oreilly MD

## 2019-07-10 ENCOUNTER — OFFICE VISIT (OUTPATIENT)
Dept: INTERNAL MEDICINE CLINIC | Age: 39
End: 2019-07-10

## 2019-07-10 VITALS
OXYGEN SATURATION: 98 % | RESPIRATION RATE: 18 BRPM | BODY MASS INDEX: 36.49 KG/M2 | WEIGHT: 219 LBS | SYSTOLIC BLOOD PRESSURE: 108 MMHG | TEMPERATURE: 98.3 F | DIASTOLIC BLOOD PRESSURE: 66 MMHG | HEIGHT: 65 IN | HEART RATE: 77 BPM

## 2019-07-10 DIAGNOSIS — J45.20 MILD INTERMITTENT ASTHMA WITHOUT COMPLICATION: ICD-10-CM

## 2019-07-10 DIAGNOSIS — R09.82 POST-NASAL DRIP: Primary | ICD-10-CM

## 2019-07-10 DIAGNOSIS — E66.01 SEVERE OBESITY (BMI 35.0-39.9) WITH COMORBIDITY (HCC): ICD-10-CM

## 2019-07-10 DIAGNOSIS — K21.9 GASTROESOPHAGEAL REFLUX DISEASE, ESOPHAGITIS PRESENCE NOT SPECIFIED: ICD-10-CM

## 2019-07-10 RX ORDER — FLUTICASONE PROPIONATE AND SALMETEROL 100; 50 UG/1; UG/1
1 POWDER RESPIRATORY (INHALATION) 2 TIMES DAILY
Qty: 1 INHALER | Refills: 2 | Status: SHIPPED | OUTPATIENT
Start: 2019-07-10

## 2019-07-10 NOTE — PROGRESS NOTES
Kassandra Howard is a 46 y.o. female who was seen in clinic today (7/10/2019) for an acute visit. Assessment & Plan:   Diagnoses and all orders for this visit:    1. Post-nasal drip- worsening & uncontrolled, discussed differential diagnosis and at this time favor a viral or allergy etiology. Does not sound bacterial.  We reviewed treatment options and reviewed the importance of avoiding unnecessary antibiotic therapy, reviewed which OTC medications to use and avoid. Will start on sudafed in AM and move Flonase to PM.  She will try rotating if this does not work. Reviewed medrol dose pack vs antibiotics if this does not help or gets worse. Red flags were reviewed with her, expected time course for resolution reviewed, and she will update me in 3-5 days if no improvement. 2. Mild intermittent asthma without complication- control is decent but could be better, not tolerating Symbicort, but did well w/ Advair in the past so will restart this, reviewed expectations  -     fluticasone propion-salmeterol (ADVAIR/WIXELA) 100-50 mcg/dose diskus inhaler; Take 1 Puff by inhalation two (2) times a day. 3. Severe obesity (BMI 35.0-39. 9) with comorbidity ([address]- poorly controlled, worsening, I have recommended the following interventions: encourage exercise and lifestyle education regarding diet. 4. Gastroesophageal reflux disease, esophagitis presence not specified- well controlled, continue current prn use of meds, try to avoid triggers. Follow-up and Dispositions    · Follow up - 1 year or sooner if no changes           Subjective:   Shakira was seen today for Nasal Congestion    URI Review  Shakira returns to clinic today to talk about: URI symptoms for 2 week ago, which are gradually improving since that time. She reports sinus congestion, post nasal drip, dry cough and chest congestion. She denies a history of: fever, wheezing, SOB and DANIELLE.   Treatments have included: flonase, zyrtec, singulair, OTC GERD meds, and albuterol which have been not very effective. Relevant PMH: Asthma and allergic rhinitis. Patient reports sick contacts: no.       Pulmonary Review  The patient is being seen for asthma. Symptoms occur: infrequently. Current limitations in activity: depends on humidity. Coughing when present is described as none. Wheezing when present is described as none. Regimen compliance: she is not using Symbicort regularly, reports it gives her a heavy breathing sensation. Frequency of rescue inhaler: monthly. Patient does not smoke cigarettes. GI Review  She has a history of GERD. Since last visit: no changes. She denies: abdominal bloating, heartburn, midepigastric pain and nausea. She has identified the following triggers: certain foods. Medical therapy currently involves Prilosec, she is using as needed, every 2-3 months. Prior to Admission medications    Medication Sig Start Date End Date Taking? Authorizing Provider   omeprazole (PRILOSEC) 20 mg capsule Take 1 Cap by mouth daily. 7/6/18  Yes Alexi Naik MD   montelukast (SINGULAIR) 10 mg tablet Take 1 Tab by mouth daily. 3/28/18  Yes Alexi Naik MD   albuterol (PROVENTIL HFA, VENTOLIN HFA, PROAIR HFA) 90 mcg/actuation inhaler Take 1 Puff by inhalation as needed for Wheezing. 3/28/18  Yes Alexi Naik MD   fluticasone (FLONASE) 50 mcg/actuation nasal spray 2 Sprays by Both Nostrils route daily. 3/28/18  Yes Alexi Naik MD   guar gum (BENEFIBER) packet Take  by mouth two (2) times daily as needed. Yes Provider, Historical   MULTIVIT WITH CALCIUM,IRON,MIN [facility] MULTIPLE VITAMINS PO) Take 1 Tab by mouth daily. Yes Provider, Historical   levonorgestrel (MIRENA) 20 mcg/24 hour (5 years) IUD 1 Each by IntraUTERine route once. Yes Provider, Historical   loratadine (CLARITIN) 10 mg tablet Take 10 mg by mouth daily.  Indications: ALLERGIC RHINITIS   Yes Provider, Historical          No Known Allergies        Review of Systems   Constitutional: Negative for malaise/fatigue and weight loss. HENT: Positive for congestion. Respiratory: Positive for cough. Negative for shortness of breath. Cardiovascular: Negative for chest pain and palpitations. Gastrointestinal: Negative for abdominal pain, constipation, diarrhea, nausea and vomiting. Psychiatric/Behavioral: Negative for depression. The patient is not nervous/anxious and does not have insomnia. Objective:   Physical Exam   Constitutional: No distress. HENT:   Right Ear: Tympanic membrane is not erythematous and not bulging. No middle ear effusion. Left Ear: Tympanic membrane is not erythematous and not bulging. No middle ear effusion. Nose: Mucosal edema present. No rhinorrhea. Right sinus exhibits no maxillary sinus tenderness and no frontal sinus tenderness. Left sinus exhibits no maxillary sinus tenderness and no frontal sinus tenderness. Mouth/Throat: Uvula is midline and mucous membranes are normal. No oropharyngeal exudate or posterior oropharyngeal erythema. Eyes: Conjunctivae are normal. No scleral icterus. Neck: Neck supple. Cardiovascular: Regular rhythm and normal heart sounds. No murmur heard. Pulmonary/Chest: Effort normal and breath sounds normal. She has no wheezes. She has no rales. Lymphadenopathy:     She has no cervical adenopathy. Visit Vitals  /66   Pulse 77   Temp 98.3 °F (36.8 °C) (Oral)   Resp 18   Ht 5' 4.6\" (1.641 m)   Wt 219 lb (99.3 kg)   SpO2 98%   BMI 36.90 kg/m²         Disclaimer:  Advised her to call back or return to office if symptoms worsen/change/persist.  Discussed expected course/resolution/complications of diagnosis in detail with patient. Medication risks/benefits/costs/interactions/alternatives discussed with patient. She was given an after visit summary which includes diagnoses, current medications, & vitals.   She expressed understanding with the diagnosis and plan. Aspects of this note may have been generated using voice recognition software. Despite editing, there may be some syntax errors.        Ebony Kim MD

## 2019-07-24 ENCOUNTER — OFFICE VISIT (OUTPATIENT)
Dept: INTERNAL MEDICINE CLINIC | Age: 39
End: 2019-07-24

## 2019-07-24 VITALS
TEMPERATURE: 98.5 F | SYSTOLIC BLOOD PRESSURE: 104 MMHG | RESPIRATION RATE: 14 BRPM | HEIGHT: 65 IN | WEIGHT: 215 LBS | HEART RATE: 77 BPM | OXYGEN SATURATION: 97 % | BODY MASS INDEX: 35.82 KG/M2 | DIASTOLIC BLOOD PRESSURE: 76 MMHG

## 2019-07-24 DIAGNOSIS — J45.20 MILD INTERMITTENT ASTHMA WITHOUT COMPLICATION: ICD-10-CM

## 2019-07-24 DIAGNOSIS — L03.116 CELLULITIS OF LEFT LEG: Primary | ICD-10-CM

## 2019-07-24 RX ORDER — CEPHALEXIN 500 MG/1
500 CAPSULE ORAL 3 TIMES DAILY
Qty: 21 CAP | Refills: 0 | Status: SHIPPED | OUTPATIENT
Start: 2019-07-24 | End: 2019-07-31

## 2019-07-24 NOTE — PATIENT INSTRUCTIONS

## 2019-07-24 NOTE — PROGRESS NOTES
Barbra Almendarez is a 46 y.o. female who was seen in clinic today (7/24/2019) for an acute visit. Assessment & Plan:     Diagnoses and all orders for this visit:    1. Cellulitis of left leg- this is a new problem, symptoms are: worsened, differential dx reviewed with the patient, looks classic. Is a small area, reviewed treatment options. Will start w/ OTC abx cream and ice. If no changes then will start on PO abx, script provided. Red flags were reviewed with the patient to RTC or notify me, expected time course for resolution reviewed. See AVS.     2. Mild intermittent asthma without complication- improved, seen a few weeks ago, never started Advair, doing well. Post nasal drip also reviewed w/o intervention    Other orders  -     cephalexin (KEFLEX) 500 mg capsule; Take 1 Cap by mouth three (3) times daily for 7 days. Follow-up and Dispositions    · Return if symptoms worsen or fail to improve. Subjective:   Shakira was seen today for Skin Problem    Dermatology Review  She is here to talk about ingrown hair. She noticed it since March and over the last 7-10 days, with gradually worsening since that time. Location: left thigh. Symptoms include itching and swelling and feels warm to the touch and is painful to the touch. No trauma. Treatment to date has included rubbing alcohol w/o any change. Prior to Admission medications    Medication Sig Start Date End Date Taking? Authorizing Provider   fluticasone propion-salmeterol (ADVAIR/WIXELA) 100-50 mcg/dose diskus inhaler Take 1 Puff by inhalation two (2) times a day. 7/10/19  Yes Cynthia Rizo MD   omeprazole (PRILOSEC) 20 mg capsule Take 1 Cap by mouth daily. 7/6/18  Yes Cynthia Rizo MD   montelukast (SINGULAIR) 10 mg tablet Take 1 Tab by mouth daily.  3/28/18  Yes Cynthia Rizo MD   albuterol (PROVENTIL HFA, VENTOLIN HFA, PROAIR HFA) 90 mcg/actuation inhaler Take 1 Puff by inhalation as needed for Wheezing. 3/28/18  Yes Loi Barnes MD   fluticasone [facility]) 50 mcg/actuation nasal spray 2 Sprays by Both Nostrils route daily. 3/28/18  Yes Loi Barnes MD   guar gum (BENEFIBER) packet Take  by mouth two (2) times daily as needed. Yes Provider, Historical   MULTIVIT WITH CALCIUM,IRON,MIN [facility] MULTIPLE VITAMINS PO) Take 1 Tab by mouth daily. Yes Provider, Historical   levonorgestrel (MIRENA) 20 mcg/24 hour (5 years) IUD 1 Each by IntraUTERine route once. Yes Provider, Historical   loratadine (CLARITIN) 10 mg tablet Take 10 mg by mouth daily. Indications: ALLERGIC RHINITIS   Yes Provider, Historical          No Known Allergies        Review of Systems   Constitutional: Negative for chills, fever and malaise/fatigue. HENT: Negative for congestion. Respiratory: Negative for cough and shortness of breath. Objective:   Physical Exam   Cardiovascular: Regular rhythm and normal heart sounds. No murmur heard. Pulmonary/Chest: Effort normal and breath sounds normal. She has no wheezes. She has no rales. Skin:   Front L thigh, upper 1/3 there is a 3mm area that looks like an ingrown hair vs scab, there is a 3-4cm area of induration, slightly erythema, tender to the touch, no discharge, no pustule         Visit Vitals  /76   Pulse 77   Temp 98.5 °F (36.9 °C) (Oral)   Resp 14   Ht 5' 4.6\" (1.641 m)   Wt 215 lb (97.5 kg)   SpO2 97%   BMI 36.22 kg/m²         Disclaimer:  Advised her to call back or return to office if symptoms worsen/change/persist.  Discussed expected course/resolution/complications of diagnosis in detail with patient. Medication risks/benefits/costs/interactions/alternatives discussed with patient. She was given an after visit summary which includes diagnoses, current medications, & vitals. She expressed understanding with the diagnosis and plan. Aspects of this note may have been generated using voice recognition software.  Despite editing, there may be some syntax errors.        Vicki Pearson MD

## 2019-08-28 ENCOUNTER — HOSPITAL ENCOUNTER (OUTPATIENT)
Dept: ULTRASOUND IMAGING | Age: 39
Discharge: HOME | End: 2019-08-28
Attending: INTERNAL MEDICINE
Payer: COMMERCIAL

## 2019-08-28 ENCOUNTER — OFFICE VISIT (OUTPATIENT)
Dept: INTERNAL MEDICINE CLINIC | Age: 39
End: 2019-08-28

## 2019-08-28 VITALS
TEMPERATURE: 98.4 F | BODY MASS INDEX: 35.32 KG/M2 | WEIGHT: 212 LBS | OXYGEN SATURATION: 97 % | SYSTOLIC BLOOD PRESSURE: 106 MMHG | HEART RATE: 80 BPM | RESPIRATION RATE: 14 BRPM | HEIGHT: 65 IN | DIASTOLIC BLOOD PRESSURE: 68 MMHG

## 2019-08-28 DIAGNOSIS — M79.89 LEFT LEG SWELLING: ICD-10-CM

## 2019-08-28 DIAGNOSIS — M79.89 LEFT LEG SWELLING: Primary | ICD-10-CM

## 2019-08-28 DIAGNOSIS — Z13.220 LIPID SCREENING: ICD-10-CM

## 2019-08-28 PROCEDURE — 93971 EXTREMITY STUDY: CPT

## 2019-08-28 NOTE — PROGRESS NOTES
Woodrow Simms is a [age] [facility] y.o. female who was seen in clinic today (8/28/2019) for an acute visit. Assessment & Plan:     Diagnoses and all orders for this visit:    1. Left leg swelling- this is a new problem, differential dx reviewed with the patient, and is concerning for DVT. Did review could also be Baker's cyst.  Will get her set up with US and labs. Red flags were reviewed with the patient to RTC or notify me. Reviewed treatment will depend on results. -     DUPLEX UPPER EXT VENOUS LEFT; Future  -     METABOLIC PANEL, COMPREHENSIVE  -     CBC W/O DIFF    2. Lipid screening- she reports needing labs for  physical, nothing recent on file, she is fasting, labs ordered  -     LIPID PANEL      US results came back negative. Pt notified of results. Unclear etiology but no DVT or Baker's cyst.  Will monitor. Will treat w/ rest, elevation, and heat. Follow-up and Dispositions    · Return if symptoms worsen or fail to improve. Subjective:   Shakira was seen today for Skin Problem (swollen vein) and Foot Swelling    Pain Review:  She presents do to pain of her left lower leg that is secondary to no known injury and started 2 days. Since it started her pain has worsened. She describes the pain as pulling sensation or inflamed feeling. It is tender to the touch. It is constant but fluctuating in intensity. She also reports swelling in the L foot, worse as the day goes on. No swelling in right leg. No h/o trauma. No h/o long travel. No new medications. Exacerbating factors identifiable by patient are touching it. She has tried the following: nothing. Prior to Admission medications    Medication Sig Start Date End Date Taking? Authorizing Provider   fluticasone propion-salmeterol (ADVAIR/WIXELA) 100-50 mcg/dose diskus inhaler Take 1 Puff by inhalation two (2) times a day.  7/10/19  Yes Annette Mcgill MD   omeprazole (PRILOSEC) 20 mg capsule Take 1 Cap by mouth daily. 7/6/18  Yes Elham Bill MD   montelukast (SINGULAIR) 10 mg tablet Take 1 Tab by mouth daily. 3/28/18  Yes Elham Bill MD   albuterol (PROVENTIL HFA, VENTOLIN HFA, PROAIR HFA) 90 mcg/actuation inhaler Take 1 Puff by inhalation as needed for Wheezing. 3/28/18  Yes Elham Bill MD   fluticasone (FLONASE) 50 mcg/actuation nasal spray 2 Sprays by Both Nostrils route daily. 3/28/18  Yes Elham Bill MD   guar gum (BENEFIBER) packet Take  by mouth two (2) times daily as needed. Yes Provider, Historical   MULTIVIT WITH CALCIUM,IRON,MIN [facility] MULTIPLE VITAMINS PO) Take 1 Tab by mouth daily. Yes Provider, Historical   levonorgestrel (MIRENA) 20 mcg/24 hour (5 years) IUD 1 Each by IntraUTERine route once. Yes Provider, Historical   loratadine (CLARITIN) 10 mg tablet Take 10 mg by mouth daily. Indications: ALLERGIC RHINITIS   Yes Provider, Historical          No Known Allergies        Review of Systems   Constitutional: Negative for chills, fever and weight loss. Respiratory: Negative for cough and shortness of breath. Cardiovascular: Positive for leg swelling. Negative for chest pain, palpitations, orthopnea and claudication. Objective:   Physical Exam   Cardiovascular: Regular rhythm and normal heart sounds. No murmur heard. Pulmonary/Chest: Effort normal and breath sounds normal. She has no wheezes. She has no rales. Musculoskeletal: She exhibits no edema. Left lower leg: She exhibits tenderness. She exhibits no bony tenderness, no swelling and no deformity. Legs:        Visit Vitals  /68   Pulse 80   Temp 98.4 °F (36.9 °C) (Oral)   Resp 14   Ht 5' 4.6\" (1.641 m)   Wt 212 lb (96.2 kg)   SpO2 97%   BMI 35.72 kg/m²         Disclaimer:  Advised her to call back or return to office if symptoms worsen/change/persist.  Discussed expected course/resolution/complications of diagnosis in detail with patient.     Medication risks/benefits/costs/interactions/alternatives discussed with patient. She was given an after visit summary which includes diagnoses, current medications, & vitals. She expressed understanding with the diagnosis and plan. Aspects of this note may have been generated using voice recognition software. Despite editing, there may be some syntax errors.        Johan Garcia MD

## 2019-08-29 LAB
ALBUMIN SERPL-MCNC: 4.2 G/DL (ref 3.5–5.5)
ALBUMIN/GLOB SERPL: 1.6 {RATIO} (ref 1.2–2.2)
ALP SERPL-CCNC: 79 IU/L (ref 39–117)
ALT SERPL-CCNC: 16 IU/L (ref 0–32)
AST SERPL-CCNC: 13 IU/L (ref 0–40)
BILIRUB SERPL-MCNC: 1 MG/DL (ref 0–1.2)
BUN SERPL-MCNC: 8 MG/DL (ref 6–20)
BUN/CREAT SERPL: 11 (ref 9–23)
CALCIUM SERPL-MCNC: 9.5 MG/DL (ref 8.7–10.2)
CHLORIDE SERPL-SCNC: 105 MMOL/L (ref 96–106)
CHOLEST SERPL-MCNC: 200 MG/DL (ref 100–199)
CO2 SERPL-SCNC: 25 MMOL/L (ref 20–29)
CREAT SERPL-MCNC: 0.75 MG/DL (ref 0.57–1)
ERYTHROCYTE [DISTWIDTH] IN BLOOD BY AUTOMATED COUNT: 12.7 % (ref 12.3–15.4)
GLOBULIN SER CALC-MCNC: 2.6 G/DL (ref 1.5–4.5)
GLUCOSE SERPL-MCNC: 84 MG/DL (ref 65–99)
HCT VFR BLD AUTO: 40.8 % (ref 34–46.6)
HDLC SERPL-MCNC: 48 MG/DL
HGB BLD-MCNC: 13.4 G/DL (ref 11.1–15.9)
LDLC SERPL CALC-MCNC: 139 MG/DL (ref 0–99)
MCH RBC QN AUTO: 29.6 PG (ref 26.6–33)
MCHC RBC AUTO-ENTMCNC: 32.8 G/DL (ref 31.5–35.7)
MCV RBC AUTO: 90 FL (ref 79–97)
PLATELET # BLD AUTO: 290 X10E3/UL (ref 150–450)
POTASSIUM SERPL-SCNC: 4.2 MMOL/L (ref 3.5–5.2)
PROT SERPL-MCNC: 6.8 G/DL (ref 6–8.5)
RBC # BLD AUTO: 4.52 X10E6/UL (ref 3.77–5.28)
SODIUM SERPL-SCNC: 143 MMOL/L (ref 134–144)
TRIGL SERPL-MCNC: 65 MG/DL (ref 0–149)
VLDLC SERPL CALC-MCNC: 13 MG/DL (ref 5–40)
WBC # BLD AUTO: 7.6 X10E3/UL (ref 3.4–10.8)

## 2019-08-29 NOTE — PROGRESS NOTES
Results released to patient via [employer]. All labs are stable or at goal for her.   Did not calculate 10 yr CVD risk

## 2019-10-21 ENCOUNTER — TELEPHONE (OUTPATIENT)
Dept: INTERNAL MEDICINE CLINIC | Age: 39
End: 2019-10-21

## 2019-10-22 NOTE — TELEPHONE ENCOUNTER
Patient states she has gallstones she had an ultrasound today at the [place] and they recommended she see a  general surgeon, recommended Dr. Rosanne Overton at [facility] general surgery at [facility], she will contact them . [facility] but she wanted Dr. Darcy Bonilla' input on general surgeon to use.

## 2019-10-29 ENCOUNTER — OFFICE VISIT (OUTPATIENT)
Dept: SURGERY | Age: 39
End: 2019-10-29

## 2019-10-29 VITALS
BODY MASS INDEX: 35.65 KG/M2 | HEART RATE: 79 BPM | WEIGHT: 214 LBS | OXYGEN SATURATION: 98 % | DIASTOLIC BLOOD PRESSURE: 78 MMHG | HEIGHT: 65 IN | RESPIRATION RATE: 19 BRPM | TEMPERATURE: 98.3 F | SYSTOLIC BLOOD PRESSURE: 129 MMHG

## 2019-10-29 DIAGNOSIS — R10.84 GENERALIZED ABDOMINAL PAIN: Primary | ICD-10-CM

## 2019-10-29 DIAGNOSIS — K80.20 GALLSTONES: ICD-10-CM

## 2019-10-29 NOTE — PROGRESS NOTES
1. Have you been to the ER, urgent care clinic since your last visit? Hospitalized since your last visit? [facility]    2. Have you seen or consulted any other health care providers outside of the [address] [place] [address] since your last visit? Include any pap smears or colon screening.  No

## 2019-10-29 NOTE — LETTER
10/30/19 Patient: Barbara Bonilla YOB: 1980 Date of Visit: 10/29/2019 Tushar Johnson MD 
[address] [id number] VIA In Basket Dear Tushar Johnson MD, Thank you for referring Ms. Shakira Rock to [facility] for evaluation. My notes for this consultation are attached. If you have questions, please do not hesitate to call me. I look forward to following your patient along with you.  
 
 
Sincerely, 
 
Julian Teixeira MD

## 2019-10-30 PROBLEM — K80.20 GALLSTONES: Status: ACTIVE | Noted: 2019-10-30

## 2019-10-30 NOTE — PROGRESS NOTES
[facility] History and Physical    Chief Complaint: Gallstones, abdominal pain    History of Present Illness:      Pollo Pagan is a 46 y.o. female who was kindly referred by Brenda Anne MD for evaluation of abdominal pain and gallstones. The patient states she was recently seen at the [facility] [place] and had a workup including an U/S of her RUQ that showed evidence of gallstones without acute cholecystitis. She also had a HIDA scan which showed non-visualization of the gallbladder after 4 hours. She was told to see a surgeon regarding these findings by the [facility]. She states she does not have n/v but does have pain in the bilateral upper quadrants and epigastric regions after eating fried or greasy foods. These symptoms have been present for years. If she avoids these trigger foods, she does not really have symptoms. She thinks the pain has been getting a little worse recently. She denies fevers or jaundice.         Past Medical History:   Diagnosis Date    Allergic rhinitis 2011    Asthma     DDD (degenerative disc disease), lumbar     GERD (gastroesophageal reflux disease)     herniated disk    Hyperlipidemia     Irritable bowel syndrome with both constipation and diarrhea 2016    Obesity     Superficial thrombophlebitis of arm 12/15/2014    right arm after lab draw       Past Surgical History:   Procedure Laterality Date    HX  SECTION  ;     HX WISDOM TEETH EXTRACTION  2002       Social History     Socioeconomic History    Marital status:      Spouse name: Not on file    Number of children: 1    Years of education: Not on file    Highest education level: Not on file   Occupational History    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   Social Needs    Financial resource strain: Not on file    Food insecurity:     Worry: Not on file     Inability: Not on file    Transportation needs:     Medical: Not on file     Non-medical: Not on file Tobacco Use    Smoking status: Never Smoker    Smokeless tobacco: Never Used   Substance and Sexual Activity    Alcohol use: No     Frequency: Never     Binge frequency: Never    Drug use: No    Sexual activity: Yes     Partners: Male     Birth control/protection: IUD   Lifestyle    Physical activity:     Days per week: Not on file     Minutes per session: Not on file    Stress: Not on file   Relationships    Social connections:     Talks on phone: Not on file     Gets together: Not on file     Attends Roman [employer] service: Not on file     Active member of club or organization: Not on file     Attends meetings of clubs or organizations: Not on file     Relationship status: Not on file    Intimate partner violence:     Fear of current or ex partner: Not on file     Emotionally abused: Not on file     Physically abused: Not on file     Forced sexual activity: Not on file   Other Topics Concern     Service Not Asked    Blood Transfusions Not Asked    Caffeine Concern Not Asked    Occupational Exposure Not Asked   [employer] Hazards Not Asked    Sleep Concern Not Asked    Stress Concern Not Asked    Weight Concern Not Asked    Special Diet Not Asked    Back Care Not Asked    Exercise Not Asked     Comment: walks three days 30-40 minutes, some weights    Bike Helmet Not Asked    Seat Belt Not Asked    Self-Exams Not Asked   Social History Narrative    Not on file       Family History   Problem Relation Age of Onset    Cancer Father         prostate    Hypertension Father     Hypertension Maternal Grandmother     Diabetes Maternal Grandfather     Hypertension Paternal Grandmother     No Known Problems Mother     No Known Problems Sister     No Known Problems Brother     No Known Problems Daughter     No Known Problems Son          Current Outpatient Medications:     fluticasone propion-salmeterol (ADVAIR/WIXELA) 100-50 mcg/dose diskus inhaler, Take 1 Puff by inhalation two (2) times a day., Disp: 1 Inhaler, Rfl: 2    omeprazole (PRILOSEC) 20 mg capsule, Take 1 Cap by mouth daily. , Disp: 90 Cap, Rfl: 1    montelukast (SINGULAIR) 10 mg tablet, Take 1 Tab by mouth daily. , Disp: 90 Tab, Rfl: 3    albuterol (PROVENTIL HFA, VENTOLIN HFA, PROAIR HFA) 90 mcg/actuation inhaler, Take 1 Puff by inhalation as needed for Wheezing., Disp: 3 Inhaler, Rfl: 1    fluticasone (FLONASE) 50 mcg/actuation nasal spray, 2 Sprays by Both Nostrils route daily. , Disp: 3 Bottle, Rfl: 3    guar gum (BENEFIBER) packet, Take  by mouth two (2) times daily as needed. , Disp: , Rfl:     MULTIVIT WITH CALCIUM,IRON,MIN (WOMEN'S MULTIPLE VITAMINS PO), Take 1 Tab by mouth daily. , Disp: , Rfl:     levonorgestrel (MIRENA) 20 mcg/24 hour (5 years) IUD, 1 Each by IntraUTERine route once., Disp: , Rfl:     loratadine (CLARITIN) 10 mg tablet, Take 10 mg by mouth daily. Indications: ALLERGIC RHINITIS, Disp: , Rfl:     No Known Allergies    ROS   Constitutional: negative  Ears, Nose, Mouth, Throat, and Face: negative  Respiratory: negative  Cardiovascular: negative  Gastrointestinal: See HPI  Genitourinary:negative  Integument/Breast: negative  Hematologic/Lymphatic: negative  Behavioral/Psychiatric: negative  Allergic/Immunologic: negative      Physical Exam:     Visit Vitals  /78 (BP 1 Location: Left arm, BP Patient Position: Sitting)   Pulse 79   Temp 98.3 °F (36.8 °C) (Oral)   Resp 19   Ht 5' 4.6\" (1.641 m)   Wt 214 lb (97.1 kg)   SpO2 98%   BMI 36.05 kg/m²       General - alert and oriented, no apparent distress  HEENT - NC/AT. No scleral icterus  Pulm - CTAB, normal inspiratory effort  CV - RRR, no M/R/G  Abd - soft, NT, ND. No palpable masses or organomegaly. Obese. Ext - warm, well perfused, no edema  Lymphatics - no cervical, supraclavicular or inguinal adenopathy noted. Skin - supple, no rashes  Psychiatric - normal affect, good mood    Labs  None    Imaging  Reports form HIDA scan and U/S as in HPI.   Images not available for me to review. Assessment:     Keegan Crandall is a 46 y.o. female with symptomatic cholelithiasis and chronic cholecystitis. Recommendations:     1. I have reviewed her imaging and think that her symptoms are from cholelithiasis and chronic cholecystitis. She wants to be sure that there is no other potential cause for her pain since she is having some left upper abdominal pain as well. I have offered to get a CT to rule out other sources. She states she is not sure she wants surgery currently unless absolutely necessary as her symptoms are somewhat managed with dietary changes. I will plan to see her back in 2 weeks after her CT scan is completed to follow up and determine a plan. 40 mins of time was spent with the patient of which > 50% of the time involved face-to-face counseling of the patient regarding the proposed treatment plan.       Alice Golden MD  10/29/2019    CC: Kristel Neves MD

## 2019-11-08 ENCOUNTER — HOSPITAL ENCOUNTER (OUTPATIENT)
Dept: CT IMAGING | Age: 39
Discharge: HOME | End: 2019-11-08
Attending: SURGERY
Payer: COMMERCIAL

## 2019-11-08 DIAGNOSIS — R10.84 GENERALIZED ABDOMINAL PAIN: ICD-10-CM

## 2019-11-08 PROCEDURE — 74011636320 HC RX REV CODE- 636/320: Performed by: RADIOLOGY

## 2019-11-08 PROCEDURE — 74177 CT ABD & PELVIS W/CONTRAST: CPT

## 2019-11-08 PROCEDURE — 74011000258 HC RX REV CODE- 258: Performed by: RADIOLOGY

## 2019-11-08 RX ORDER — SODIUM CHLORIDE 0.9 % (FLUSH) 0.9 %
10 SYRINGE (ML) INJECTION
Status: COMPLETED | OUTPATIENT
Start: 2019-11-08 | End: 2019-11-08

## 2019-11-08 RX ADMIN — SODIUM CHLORIDE 100 ML: 900 INJECTION, SOLUTION INTRAVENOUS at 09:28

## 2019-11-08 RX ADMIN — Medication 10 ML: at 09:28

## 2019-11-08 RX ADMIN — IOPAMIDOL 100 ML: 755 INJECTION, SOLUTION INTRAVENOUS at 09:28

## 2019-11-21 ENCOUNTER — OFFICE VISIT (OUTPATIENT)
Dept: SURGERY | Age: 39
End: 2019-11-21

## 2019-11-21 VITALS
WEIGHT: 216 LBS | DIASTOLIC BLOOD PRESSURE: 64 MMHG | SYSTOLIC BLOOD PRESSURE: 115 MMHG | HEIGHT: 65 IN | RESPIRATION RATE: 18 BRPM | HEART RATE: 75 BPM | BODY MASS INDEX: 35.99 KG/M2 | TEMPERATURE: 98 F | OXYGEN SATURATION: 97 %

## 2019-11-21 DIAGNOSIS — K80.20 CALCULUS OF GALLBLADDER WITHOUT CHOLECYSTITIS WITHOUT OBSTRUCTION: Primary | ICD-10-CM

## 2019-11-21 NOTE — LETTER
11/27/19 Patient: Nathan Velazquez YOB: 1980 Date of Visit: 11/21/2019 Victoria Mancuso MD 
[address] [id number] VIA In Basket Dear Victoria Mancuso MD, Thank you for referring Ms. Shakira Rock to [facility] for evaluation. My notes for this consultation are attached. If you have questions, please do not hesitate to call me. I look forward to following your patient along with you.  
 
 
Sincerely, 
 
James Landaverde MD

## 2019-11-21 NOTE — PROGRESS NOTES
1. Have you been to the ER, urgent care clinic since your last visit? Hospitalized since your last visit? No    2. Have you seen or consulted any other health care providers outside of the [address] [place] [address] since your last visit? Include any pap smears or colon screening.  No

## 2019-11-27 NOTE — PROGRESS NOTES
[facility]      Clinic Note - Follow up    Subjective     Manan Jernigan returns for scheduled follow up today. She had a CT scan which showed a contracted gallbladder with gallstones and a possible 2 cm adnexal cyst.  No other acute pathology was identified. She has had 1 more episode of RUQ pain and nausea since I saw her last.  No other new complaints today. Objective     Visit Vitals  /64 (BP 1 Location: Left arm, BP Patient Position: Sitting)   Pulse 75   Temp 98 °F (36.7 °C) (Oral)   Resp 18   Ht 5' 4.6\" (1.641 m)   Wt 216 lb (98 kg)   SpO2 97%   BMI 36.39 kg/m²         PE  GEN - Awake, alert, communicating appropriately. NAD  Pulm - CTAB  CV - RRR  Abd - soft, NT, ND  Ext - warm, well perfused. Imaging  11/8/19 CT A/P: FINDINGS:      LIVER: No mass or biliary dilatation. GALLBLADDER: Contracted gallbladder with calcified stones. No pericholecystic  inflammation  SPLEEN: Small splenule but otherwise unremarkable  PANCREAS: No mass or ductal dilatation. ADRENALS: Unremarkable. KIDNEYS/URETERS: Normal symmetric nephrograms without evidence for  focal mass. No hydronephrosis   PERITONEUM: No abdominal lymphadenopathy or ascites. COLON: No dilatation or wall thickening. APPENDIX: Unremarkable. SMALL BOWEL: No dilatation or wall thickening. STOMACH: Unremarkable. PELVIS: IUD is seen in the uterus. Right adnexal cyst measuring 2.3 cm. The  bladder is unremarkable. No pelvic lymphadenopathy or free fluid. BONES: No destructive bone lesion. VISUALIZED THORAX: No significant abnormality  ADDITIONAL COMMENTS: N/A     IMPRESSION  IMPRESSION:     No evidence for acute abnormality.     Contracted gallbladder with calcified stones.     Low-density right adnexal lesion measuring approximately 2 cm may represent a  cyst.     I have reviewed the imaging and agree with this impression. Francis Moreno is a 46 y. o.yr old female with symptomatic cholelithiasis. She also has a possible small right adnexal cyst.      Plan     I have recommended a laparoscopic cholecystectomy. A complete discussion of the risks, benefits and alternatives to surgery were discussed with the patient who was keen to proceed. I have also recommended she follow up with her Gynecologist regarding the adnexal cyst to see if any further workup is necessary for this. 20 mins of time was spent with the patient of which > 50% of the time involved face-to-face counseling of the patient regarding the proposed treatment plan.         Julian Teixeira MD  11/21/19    CC: Kailee Hannah MD

## 2019-12-09 RX ORDER — CHOLECALCIFEROL (VITAMIN D3) 125 MCG
1 CAPSULE ORAL
COMMUNITY

## 2019-12-09 NOTE — PERIOP NOTES
PAT INTERVIEW COMPLETED WITH PATIENT. INSTRUCTIONS GIVEN ON MEDICATIONS AND PATIENT IS TO BE NPO AFTER MIDNIGHT PER SURGEON'S INSTRUCTIONS. INSTRUCTIONS GIVEN ON USE OF CHLORHEXIDINE SOLUTION THE EVENING BEFORE AND THE MORNING OF SURGERY. PATIENT VOICED UNDRSTANDING OF SAME. EMPHASIZED IMPORTANCE OF NOT USING LOTIONS, OILS, POWDERS OR PERFUMES ON BODY. PATIENT RELUCTANT, BUT AGREED TO THE ABOVE.

## 2019-12-10 ENCOUNTER — ANESTHESIA EVENT (OUTPATIENT)
Dept: SURGERY | Age: 39
End: 2019-12-10
Payer: COMMERCIAL

## 2019-12-10 ENCOUNTER — HOSPITAL ENCOUNTER (OUTPATIENT)
Age: 39
Setting detail: OUTPATIENT SURGERY
Discharge: HOME | End: 2019-12-10
Attending: SURGERY | Admitting: SURGERY
Payer: COMMERCIAL

## 2019-12-10 ENCOUNTER — ANESTHESIA (OUTPATIENT)
Dept: SURGERY | Age: 39
End: 2019-12-10
Payer: COMMERCIAL

## 2019-12-10 VITALS
RESPIRATION RATE: 16 BRPM | HEIGHT: 65 IN | OXYGEN SATURATION: 98 % | TEMPERATURE: 98 F | SYSTOLIC BLOOD PRESSURE: 120 MMHG | WEIGHT: 215 LBS | HEART RATE: 61 BPM | BODY MASS INDEX: 35.82 KG/M2 | DIASTOLIC BLOOD PRESSURE: 70 MMHG

## 2019-12-10 DIAGNOSIS — K80.20 GALLSTONES: Primary | ICD-10-CM

## 2019-12-10 LAB — HCG UR QL: NEGATIVE

## 2019-12-10 PROCEDURE — 77030040361 HC SLV COMPR DVT MDII -B: Performed by: SURGERY

## 2019-12-10 PROCEDURE — 76060000033 HC ANESTHESIA 1 TO 1.5 HR: Performed by: SURGERY

## 2019-12-10 PROCEDURE — 77030017006 HC DISECTR CRV1 J&J -B: Performed by: SURGERY

## 2019-12-10 PROCEDURE — 76210000026 HC REC RM PH II 1 TO 1.5 HR: Performed by: SURGERY

## 2019-12-10 PROCEDURE — 77030008608 HC TRCR ENDOSC SMTH AMR -B: Performed by: SURGERY

## 2019-12-10 PROCEDURE — 77030035029 HC NDL INSUF VERES DISP COVD -B: Performed by: SURGERY

## 2019-12-10 PROCEDURE — 76010000149 HC OR TIME 1 TO 1.5 HR: Performed by: SURGERY

## 2019-12-10 PROCEDURE — 74011250637 HC RX REV CODE- 250/637: Performed by: ANESTHESIOLOGY

## 2019-12-10 PROCEDURE — 77030002933 HC SUT MCRYL J&J -A: Performed by: SURGERY

## 2019-12-10 PROCEDURE — 74011250636 HC RX REV CODE- 250/636: Performed by: SURGERY

## 2019-12-10 PROCEDURE — 77030008684 HC TU ET CUF COVD -B: Performed by: ANESTHESIOLOGY

## 2019-12-10 PROCEDURE — 81025 URINE PREGNANCY TEST: CPT

## 2019-12-10 PROCEDURE — 77030013079 HC BLNKT BAIR HGGR 3M -A: Performed by: ANESTHESIOLOGY

## 2019-12-10 PROCEDURE — 77030012770 HC TRCR OPT FX AMR -B: Performed by: SURGERY

## 2019-12-10 PROCEDURE — 74011000250 HC RX REV CODE- 250: Performed by: SURGERY

## 2019-12-10 PROCEDURE — 88304 TISSUE EXAM BY PATHOLOGIST: CPT

## 2019-12-10 PROCEDURE — 77030039266 HC ADH SKN EXOFIN S2SG -A: Performed by: SURGERY

## 2019-12-10 PROCEDURE — 77030011640 HC PAD GRND REM COVD -A: Performed by: SURGERY

## 2019-12-10 PROCEDURE — 77030037032 HC INSRT SCIS CLICKLLINE DISP STOR -B: Performed by: SURGERY

## 2019-12-10 PROCEDURE — 77030038093 HC CATH CHOLGM OP PMI PRGV-C: Performed by: SURGERY

## 2019-12-10 PROCEDURE — 77030020829: Performed by: SURGERY

## 2019-12-10 PROCEDURE — 74011250636 HC RX REV CODE- 250/636: Performed by: ANESTHESIOLOGY

## 2019-12-10 PROCEDURE — 74011000250 HC RX REV CODE- 250: Performed by: NURSE ANESTHETIST, CERTIFIED REGISTERED

## 2019-12-10 PROCEDURE — 77030010515 HC APPL ENDOCLP LIG J&J -B: Performed by: SURGERY

## 2019-12-10 PROCEDURE — 77030007955 HC PCH ENDOSC SPEC J&J -B: Performed by: SURGERY

## 2019-12-10 PROCEDURE — 77030020053 HC ELECTRD LAPSCP COVD -B: Performed by: SURGERY

## 2019-12-10 PROCEDURE — 77030020747 HC TU INSUF ENDOSC TELE -A: Performed by: SURGERY

## 2019-12-10 PROCEDURE — 77030026438 HC STYL ET INTUB CARD -A: Performed by: ANESTHESIOLOGY

## 2019-12-10 PROCEDURE — 77030040922 HC BLNKT HYPOTHRM STRY -A

## 2019-12-10 PROCEDURE — 77030012893

## 2019-12-10 PROCEDURE — 74011250636 HC RX REV CODE- 250/636: Performed by: NURSE ANESTHETIST, CERTIFIED REGISTERED

## 2019-12-10 PROCEDURE — 77030031139 HC SUT VCRL2 J&J -A: Performed by: SURGERY

## 2019-12-10 PROCEDURE — 76210000017 HC OR PH I REC 1.5 TO 2 HR: Performed by: SURGERY

## 2019-12-10 RX ORDER — GLYCOPYRROLATE 0.2 MG/ML
INJECTION INTRAMUSCULAR; INTRAVENOUS AS NEEDED
Status: DISCONTINUED | OUTPATIENT
Start: 2019-12-10 | End: 2019-12-10 | Stop reason: HOSPADM

## 2019-12-10 RX ORDER — HYDROMORPHONE HYDROCHLORIDE 1 MG/ML
0.2 INJECTION, SOLUTION INTRAMUSCULAR; INTRAVENOUS; SUBCUTANEOUS
Status: DISCONTINUED | OUTPATIENT
Start: 2019-12-10 | End: 2019-12-10 | Stop reason: HOSPADM

## 2019-12-10 RX ORDER — CEFAZOLIN SODIUM/WATER 2 G/20 ML
2 SYRINGE (ML) INTRAVENOUS ONCE
Status: COMPLETED | OUTPATIENT
Start: 2019-12-10 | End: 2019-12-10

## 2019-12-10 RX ORDER — LIDOCAINE HYDROCHLORIDE 10 MG/ML
0.1 INJECTION, SOLUTION EPIDURAL; INFILTRATION; INTRACAUDAL; PERINEURAL AS NEEDED
Status: DISCONTINUED | OUTPATIENT
Start: 2019-12-10 | End: 2019-12-10 | Stop reason: HOSPADM

## 2019-12-10 RX ORDER — MIDAZOLAM HYDROCHLORIDE 1 MG/ML
0.5 INJECTION, SOLUTION INTRAMUSCULAR; INTRAVENOUS
Status: DISCONTINUED | OUTPATIENT
Start: 2019-12-10 | End: 2019-12-10 | Stop reason: HOSPADM

## 2019-12-10 RX ORDER — FENTANYL CITRATE 50 UG/ML
INJECTION, SOLUTION INTRAMUSCULAR; INTRAVENOUS AS NEEDED
Status: DISCONTINUED | OUTPATIENT
Start: 2019-12-10 | End: 2019-12-10 | Stop reason: HOSPADM

## 2019-12-10 RX ORDER — FENTANYL CITRATE 50 UG/ML
25 INJECTION, SOLUTION INTRAMUSCULAR; INTRAVENOUS
Status: DISCONTINUED | OUTPATIENT
Start: 2019-12-10 | End: 2019-12-10 | Stop reason: HOSPADM

## 2019-12-10 RX ORDER — SODIUM CHLORIDE 0.9 % (FLUSH) 0.9 %
5-40 SYRINGE (ML) INJECTION EVERY 8 HOURS
Status: DISCONTINUED | OUTPATIENT
Start: 2019-12-10 | End: 2019-12-10 | Stop reason: HOSPADM

## 2019-12-10 RX ORDER — NEOSTIGMINE METHYLSULFATE 1 MG/ML
INJECTION INTRAVENOUS AS NEEDED
Status: DISCONTINUED | OUTPATIENT
Start: 2019-12-10 | End: 2019-12-10 | Stop reason: HOSPADM

## 2019-12-10 RX ORDER — MIDAZOLAM HYDROCHLORIDE 1 MG/ML
1 INJECTION, SOLUTION INTRAMUSCULAR; INTRAVENOUS AS NEEDED
Status: DISCONTINUED | OUTPATIENT
Start: 2019-12-10 | End: 2019-12-10 | Stop reason: HOSPADM

## 2019-12-10 RX ORDER — OXYCODONE AND ACETAMINOPHEN 5; 325 MG/1; MG/1
1 TABLET ORAL
Qty: 40 TAB | Refills: 0 | Status: SHIPPED | OUTPATIENT
Start: 2019-12-10 | End: 2019-12-17

## 2019-12-10 RX ORDER — ROPIVACAINE HYDROCHLORIDE 5 MG/ML
30 INJECTION, SOLUTION EPIDURAL; INFILTRATION; PERINEURAL AS NEEDED
Status: DISCONTINUED | OUTPATIENT
Start: 2019-12-10 | End: 2019-12-10 | Stop reason: HOSPADM

## 2019-12-10 RX ORDER — ACETAMINOPHEN 325 MG/1
650 TABLET ORAL ONCE
Status: COMPLETED | OUTPATIENT
Start: 2019-12-10 | End: 2019-12-10

## 2019-12-10 RX ORDER — SODIUM CHLORIDE 0.9 % (FLUSH) 0.9 %
5-40 SYRINGE (ML) INJECTION AS NEEDED
Status: DISCONTINUED | OUTPATIENT
Start: 2019-12-10 | End: 2019-12-10 | Stop reason: HOSPADM

## 2019-12-10 RX ORDER — ROCURONIUM BROMIDE 10 MG/ML
INJECTION, SOLUTION INTRAVENOUS AS NEEDED
Status: DISCONTINUED | OUTPATIENT
Start: 2019-12-10 | End: 2019-12-10 | Stop reason: HOSPADM

## 2019-12-10 RX ORDER — MORPHINE SULFATE 10 MG/ML
2 INJECTION, SOLUTION INTRAMUSCULAR; INTRAVENOUS
Status: DISCONTINUED | OUTPATIENT
Start: 2019-12-10 | End: 2019-12-10 | Stop reason: HOSPADM

## 2019-12-10 RX ORDER — SODIUM CHLORIDE, SODIUM LACTATE, POTASSIUM CHLORIDE, CALCIUM CHLORIDE 600; 310; 30; 20 MG/100ML; MG/100ML; MG/100ML; MG/100ML
100 INJECTION, SOLUTION INTRAVENOUS CONTINUOUS
Status: DISCONTINUED | OUTPATIENT
Start: 2019-12-10 | End: 2019-12-10 | Stop reason: HOSPADM

## 2019-12-10 RX ORDER — DEXAMETHASONE SODIUM PHOSPHATE 4 MG/ML
INJECTION, SOLUTION INTRA-ARTICULAR; INTRALESIONAL; INTRAMUSCULAR; INTRAVENOUS; SOFT TISSUE AS NEEDED
Status: DISCONTINUED | OUTPATIENT
Start: 2019-12-10 | End: 2019-12-10 | Stop reason: HOSPADM

## 2019-12-10 RX ORDER — BUPIVACAINE HYDROCHLORIDE AND EPINEPHRINE 2.5; 5 MG/ML; UG/ML
INJECTION, SOLUTION EPIDURAL; INFILTRATION; INTRACAUDAL; PERINEURAL AS NEEDED
Status: DISCONTINUED | OUTPATIENT
Start: 2019-12-10 | End: 2019-12-10 | Stop reason: HOSPADM

## 2019-12-10 RX ORDER — ONDANSETRON 2 MG/ML
4 INJECTION INTRAMUSCULAR; INTRAVENOUS AS NEEDED
Status: DISCONTINUED | OUTPATIENT
Start: 2019-12-10 | End: 2019-12-10 | Stop reason: HOSPADM

## 2019-12-10 RX ORDER — MIDAZOLAM HYDROCHLORIDE 1 MG/ML
INJECTION, SOLUTION INTRAMUSCULAR; INTRAVENOUS AS NEEDED
Status: DISCONTINUED | OUTPATIENT
Start: 2019-12-10 | End: 2019-12-10 | Stop reason: HOSPADM

## 2019-12-10 RX ORDER — HYDROMORPHONE HYDROCHLORIDE 2 MG/ML
INJECTION, SOLUTION INTRAMUSCULAR; INTRAVENOUS; SUBCUTANEOUS AS NEEDED
Status: DISCONTINUED | OUTPATIENT
Start: 2019-12-10 | End: 2019-12-10 | Stop reason: HOSPADM

## 2019-12-10 RX ORDER — ONDANSETRON 2 MG/ML
INJECTION INTRAMUSCULAR; INTRAVENOUS AS NEEDED
Status: DISCONTINUED | OUTPATIENT
Start: 2019-12-10 | End: 2019-12-10 | Stop reason: HOSPADM

## 2019-12-10 RX ORDER — SODIUM CHLORIDE 0.9 % (FLUSH) 0.9 %
5-40 SYRINGE (ML) INJECTION AS NEEDED
Status: DISCONTINUED | OUTPATIENT
Start: 2019-12-10 | End: 2019-12-10 | Stop reason: SDUPTHER

## 2019-12-10 RX ORDER — FENTANYL CITRATE 50 UG/ML
50 INJECTION, SOLUTION INTRAMUSCULAR; INTRAVENOUS AS NEEDED
Status: DISCONTINUED | OUTPATIENT
Start: 2019-12-10 | End: 2019-12-10 | Stop reason: HOSPADM

## 2019-12-10 RX ORDER — PROPOFOL 10 MG/ML
INJECTION, EMULSION INTRAVENOUS AS NEEDED
Status: DISCONTINUED | OUTPATIENT
Start: 2019-12-10 | End: 2019-12-10 | Stop reason: HOSPADM

## 2019-12-10 RX ORDER — SODIUM CHLORIDE 9 MG/ML
25 INJECTION, SOLUTION INTRAVENOUS CONTINUOUS
Status: DISCONTINUED | OUTPATIENT
Start: 2019-12-10 | End: 2019-12-10 | Stop reason: HOSPADM

## 2019-12-10 RX ORDER — DIPHENHYDRAMINE HYDROCHLORIDE 50 MG/ML
12.5 INJECTION, SOLUTION INTRAMUSCULAR; INTRAVENOUS AS NEEDED
Status: DISCONTINUED | OUTPATIENT
Start: 2019-12-10 | End: 2019-12-10 | Stop reason: HOSPADM

## 2019-12-10 RX ORDER — SUCCINYLCHOLINE CHLORIDE 20 MG/ML
INJECTION INTRAMUSCULAR; INTRAVENOUS AS NEEDED
Status: DISCONTINUED | OUTPATIENT
Start: 2019-12-10 | End: 2019-12-10 | Stop reason: HOSPADM

## 2019-12-10 RX ORDER — SODIUM CHLORIDE, SODIUM LACTATE, POTASSIUM CHLORIDE, CALCIUM CHLORIDE 600; 310; 30; 20 MG/100ML; MG/100ML; MG/100ML; MG/100ML
25 INJECTION, SOLUTION INTRAVENOUS CONTINUOUS
Status: DISCONTINUED | OUTPATIENT
Start: 2019-12-10 | End: 2019-12-10 | Stop reason: HOSPADM

## 2019-12-10 RX ORDER — OXYCODONE HYDROCHLORIDE 5 MG/1
5 TABLET ORAL AS NEEDED
Status: DISCONTINUED | OUTPATIENT
Start: 2019-12-10 | End: 2019-12-10 | Stop reason: HOSPADM

## 2019-12-10 RX ORDER — LIDOCAINE HYDROCHLORIDE 20 MG/ML
INJECTION, SOLUTION EPIDURAL; INFILTRATION; INTRACAUDAL; PERINEURAL AS NEEDED
Status: DISCONTINUED | OUTPATIENT
Start: 2019-12-10 | End: 2019-12-10 | Stop reason: HOSPADM

## 2019-12-10 RX ORDER — SODIUM CHLORIDE 0.9 % (FLUSH) 0.9 %
5-40 SYRINGE (ML) INJECTION EVERY 8 HOURS
Status: DISCONTINUED | OUTPATIENT
Start: 2019-12-10 | End: 2019-12-10 | Stop reason: SDUPTHER

## 2019-12-10 RX ADMIN — OXYCODONE HYDROCHLORIDE 5 MG: 5 TABLET ORAL at 13:49

## 2019-12-10 RX ADMIN — LIDOCAINE HYDROCHLORIDE 80 MG: 20 INJECTION, SOLUTION EPIDURAL; INFILTRATION; INTRACAUDAL; PERINEURAL at 10:34

## 2019-12-10 RX ADMIN — NEOSTIGMINE METHYLSULFATE 3 MG: 1 INJECTION, SOLUTION INTRAMUSCULAR; INTRAVENOUS; SUBCUTANEOUS at 11:22

## 2019-12-10 RX ADMIN — GLYCOPYRROLATE 0.4 MG: 0.2 INJECTION, SOLUTION INTRAMUSCULAR; INTRAVENOUS at 11:22

## 2019-12-10 RX ADMIN — PROPOFOL 150 MG: 10 INJECTION, EMULSION INTRAVENOUS at 10:34

## 2019-12-10 RX ADMIN — SODIUM CHLORIDE, SODIUM LACTATE, POTASSIUM CHLORIDE, AND CALCIUM CHLORIDE 25 ML/HR: 600; 310; 30; 20 INJECTION, SOLUTION INTRAVENOUS at 10:16

## 2019-12-10 RX ADMIN — FENTANYL CITRATE 75 MCG: 50 INJECTION, SOLUTION INTRAMUSCULAR; INTRAVENOUS at 10:34

## 2019-12-10 RX ADMIN — ROCURONIUM BROMIDE 10 MG: 10 SOLUTION INTRAVENOUS at 10:34

## 2019-12-10 RX ADMIN — FENTANYL CITRATE 25 MCG: 50 INJECTION, SOLUTION INTRAMUSCULAR; INTRAVENOUS at 10:26

## 2019-12-10 RX ADMIN — ONDANSETRON 4 MG: 2 INJECTION INTRAMUSCULAR; INTRAVENOUS at 13:31

## 2019-12-10 RX ADMIN — ROCURONIUM BROMIDE 30 MG: 10 SOLUTION INTRAVENOUS at 10:40

## 2019-12-10 RX ADMIN — MIDAZOLAM 2 MG: 1 INJECTION INTRAMUSCULAR; INTRAVENOUS at 10:26

## 2019-12-10 RX ADMIN — SUCCINYLCHOLINE CHLORIDE 140 MG: 20 INJECTION, SOLUTION INTRAMUSCULAR; INTRAVENOUS at 10:34

## 2019-12-10 RX ADMIN — ACETAMINOPHEN 650 MG: 325 TABLET ORAL at 10:18

## 2019-12-10 RX ADMIN — GLYCOPYRROLATE 0.2 MG: 0.2 INJECTION, SOLUTION INTRAMUSCULAR; INTRAVENOUS at 11:28

## 2019-12-10 RX ADMIN — ONDANSETRON HYDROCHLORIDE 4 MG: 2 INJECTION, SOLUTION INTRAMUSCULAR; INTRAVENOUS at 11:20

## 2019-12-10 RX ADMIN — Medication 2 G: at 10:38

## 2019-12-10 RX ADMIN — DEXAMETHASONE SODIUM PHOSPHATE 4 MG: 4 INJECTION, SOLUTION INTRAMUSCULAR; INTRAVENOUS at 10:40

## 2019-12-10 RX ADMIN — HYDROMORPHONE HYDROCHLORIDE 0.5 MG: 2 INJECTION, SOLUTION INTRAMUSCULAR; INTRAVENOUS; SUBCUTANEOUS at 11:01

## 2019-12-10 NOTE — DISCHARGE INSTRUCTIONS
Laparoscopic cholecystectomy      Patient Discharge Instructions    James Churchill / [id number] : 1980    Admitted 12/10/2019 Discharged: 12/10/2019       PATIENT INSTRUCTIONS  GALLBLADDER SURGERY  (CHOLECYSTECTOMY)    FOLLOW-UP:  Please make an appointment with your physician in 10 - 14 day(s). Call your physician immediately if you have any fevers greater than 101.5, drainage from your wound that is not clear or looks infected, persistent bleeding, increasing abdominal pain, problems urinating, or persistent nausea/vomiting. You should be aware that you may have right shoulder pain after surgery and that this will progressively go away. This is called 'referred pain' and is from the area of the gallbladder. It can also be caused by gas that may be trapped under the diaphragm from the surgery, especially if it was performed laparoscopically through mini-incisions. This gas will progressively get reabsorbed by your body. WOUND CARE INSTRUCTIONS:   You may shower at home. If clothing rubs against the wound or causes irritation and the wound is not draining you may cover it with a dry dressing during the daytime. Try to keep the wound dry and avoid ointments on the wound unless directed to do so. If the wound becomes bright red and painful or starts to drain infected material that is not clear, please contact your physician immediately. You should also call if you begin to drain fluid that is thin and greenish-brown from the wound and appears to look like bile. If the wound though is mildly pink and has a thick firm ridge underneath it, this is normal, and is referred to as a healing ridge. This will resolve over the next 4-6 weeks. DIET:  You may eat any foods that you can tolerate. It is a good idea to eat a high fiber diet and take in plenty of fluids to prevent constipation.   If you do become constipated you may want to take a mild laxative or take ducolax tablets on a daily basis until your bowel habits are regular. Constipation can be very uncomfortable, along with straining, after recent abdominal surgery. ACTIVITY:  You are encouraged to cough and deep breath or use your incentive spirometer if you were given one, every 15-30 minutes when awake. This will help prevent respiratory complications and low grade fevers post-operatively. You may want to hug a pillow when coughing and sneezing to add additional support to the surgical area(s) which will decrease pain during these times. You are encouraged to walk and engage in light activity for the next two weeks. You should not lift more than 20 pounds during this time frame as it could put you at increased risk for a post-operative hernia. Twenty pounds is roughly equivalent to a plastic bag of groceries. · Most people are able to return to work within 1 to 2 weeks after surgery. · You may shower 24 hours after surgery. Pat the cut (incision) dry. Do not take a bath for the first week. · Your doctor will tell you when you can have sex again. MEDICATIONS:  Try to take narcotic medications and anti-inflammatory medications, such as tylenol, ibuprofen, naprosyn, etc., with food. This will minimize stomach upset from the medication. Should you develop nausea and vomiting from the pain medication, or develop a rash, please discontinue the medication and contact your physician. You should not drive, make important decisions, or operate machinery when taking narcotic pain medication. · Take ibuprofen (Motrin) as scheduled then combine with oxycodone/acetaminophen (Percocet, Roxicet, Tylox) as needed for severe pain. QUESTIONS:  Please feel free to call Dr. David López office ([phone]) if you have any questions, and they will be glad to assist you. Follow-up with Dr. Llanos in 2 week(s). Call the office to schedule your appointment.       Information obtained by :    I understand that if any problems occur once I am at home I am to contact my physician. I understand and acknowledge receipt of the instructions indicated above. Physician's or R.N.'s Signature                                                                  Date/Time                                                                                                                                              Patient or Representative Signature                                                          Date/Time            DISCHARGE SUMMARY from Nurse    PATIENT INSTRUCTIONS:    After general anesthesia or intravenous sedation, for 24 hours or while taking prescription Narcotics:  · Limit your activities  · Do not drive and operate hazardous machinery  · Do not make important personal or business decisions  · Do  not drink alcoholic beverages  · If you have not urinated within 8 hours after discharge, please contact your surgeon on call. Report the following to your surgeon:  · Excessive pain, swelling, redness or odor of or around the surgical area  · Temperature over 100.5  · Nausea and vomiting lasting longer than 4 hours or if unable to take medications  · Any signs of decreased circulation or nerve impairment to extremity: change in color, persistent  numbness, tingling, coldness or increase pain  Any questions  The discharge information has been reviewed with the {PATIENT PARENT GUARDIAN:29476}. The {PATIENT PARENT GUARDIAN:13963} verbalized understanding. Discharge medications reviewed with the {Dishcar meds reviewed JGUS:93428} and appropriate educational materials and side effects teaching were provided.   ___________________________________________________________________________________________________________________________________

## 2019-12-10 NOTE — ANESTHESIA POSTPROCEDURE EVALUATION
Post-Anesthesia Evaluation and Assessment    Patient: Barbara Bonilla MRN: [id number]  SSN: [id number]    YOB: 1980  Age: 46 y.o. Sex: female      I have evaluated the patient and they are stable and ready for discharge from the PACU. Cardiovascular Function/Vital Signs  Visit Vitals  /75   Pulse 61   Temp 36.7 °C (98 °F)   Resp 16   Ht 5' 4.5\" (1.638 m)   Wt 97.5 kg (215 lb)   SpO2 98%   BMI 36.33 kg/m²       Patient is status post General anesthesia for Procedure(s):  LAPAROSCOPIC CHOLECYSTECTOMY. Nausea/Vomiting: None    Postoperative hydration reviewed and adequate. Pain:  Pain Scale 1: Numeric (0 - 10) (12/10/19 1227)  Pain Intensity 1: 0 (12/10/19 1227)   Managed    Neurological Status:   Neuro (WDL): Within Defined Limits (12/10/19 1227)   At baseline    Mental Status, Level of Consciousness: Alert and  oriented to person, place, and time    Pulmonary Status:   O2 Device: Room air (12/10/19 1227)   Adequate oxygenation and airway patent    Complications related to anesthesia: None    Post-anesthesia assessment completed. No concerns    Signed By: Charlie Holbrook MD     December 10, 2019              Procedure(s):  LAPAROSCOPIC CHOLECYSTECTOMY. general    <BSHSIANPOST>    Vitals Value Taken Time   /75 12/10/2019 12:31 PM   Temp 36.7 °C (98 °F) 12/10/2019 12:27 PM   Pulse 61 12/10/2019 11:51 AM   Resp 16 12/10/2019 11:51 AM   SpO2 98 % 12/10/2019 12:31 PM   Vitals shown include unvalidated device data.

## 2019-12-10 NOTE — H&P
Date of Surgery Update:  Ottoniel Guthrie was seen and examined. History and physical has been reviewed. The patient has been examined. There have been no significant clinical changes since the completion of the originally dated History and Physical.  Patient with findings of nonfilling of gallbladder on HIDA scan and symptoms consistent with biliary colic. Plan for laparoscopic cholecystectomy. A complete discussion of the risks, benefits and alternatives to surgery were discussed with the patient who was keen to proceed. Signed By: Ivy Tay MD     December 10, 2019 10:11 AM         Please note from the office and include the additional information below:    Past Medical History  Past Medical History:   Diagnosis Date    Allergic rhinitis 2011    Asthma     DDD (degenerative disc disease), lumbar     GERD (gastroesophageal reflux disease)     herniated disk    Hyperlipidemia     Irritable bowel syndrome with both constipation and diarrhea 2016    Obesity     Superficial thrombophlebitis of arm 12/15/2014    right arm after lab draw        Past Surgical History  Past Surgical History:   Procedure Laterality Date    HX  SECTION  ;     HX WISDOM TEETH EXTRACTION          Social History  The patient Ottoniel Guthrie  reports that she has never smoked. She has never used smokeless tobacco. She reports that she does not drink alcohol or use drugs.      Family History  Family History   Problem Relation Age of Onset   [de-identified] Cancer Father         prostate    Hypertension Father     Hypertension Maternal Grandmother     Diabetes Maternal Grandfather     Hypertension Paternal Grandmother     No Known Problems Mother     No Known Problems Sister     No Known Problems Brother     No Known Problems Daughter     No Known Problems Son           Ivy Tay MD

## 2019-12-10 NOTE — ROUTINE PROCESS
Patient: Ginette Porter MRN: [id number]  SSN: [id number] YOB: 1980  Age: 46 y.o. Sex: female Patient is status post Procedure(s): LAPAROSCOPIC CHOLECYSTECTOMY. Surgeon(s) and Role: Iggy Crowell MD - Primary Local/Dose/Irrigation:  30ml Marcaine 0.25% with epi Peripheral IV 12/10/19 Right Hand (Active) Airway - Endotracheal Tube 12/10/19 Oral (Active) Dressing/Packing:  Wound Abdomen 3 trocar sites-Dressing Type: Topical skin adhesive/glue (12/10/19 1121) Splint/Cast:  ] Other:  NA

## 2019-12-10 NOTE — ANESTHESIA PREPROCEDURE EVALUATION
Relevant Problems   No relevant active problems       Anesthetic History   No history of anesthetic complications            Review of Systems / Medical History  Patient summary reviewed, nursing notes reviewed and pertinent labs reviewed    Pulmonary            Asthma        Neuro/Psych   Within defined limits           Cardiovascular              Hyperlipidemia    Exercise tolerance: >4 METS     GI/Hepatic/Renal     GERD: well controlled          Comments: Gallstones Endo/Other        Morbid obesity and arthritis     Other Findings              Physical Exam    Airway  Mallampati: II  TM Distance: 4 - 6 cm  Neck ROM: normal range of motion   Mouth opening: Normal     Cardiovascular  Regular rate and rhythm,  S1 and S2 normal,  no murmur, click, rub, or gallop             Dental  No notable dental hx       Pulmonary  Breath sounds clear to auscultation               Abdominal  GI exam deferred       Other Findings            Anesthetic Plan    ASA: 2  Anesthesia type: general          Induction: Intravenous  Anesthetic plan and risks discussed with: Patient

## 2019-12-10 NOTE — OP NOTES
OPERATIVE NOTE    Date of Procedure: 12/10/2019   Preoperative Diagnosis: CHOLELITHIASIS, CHRONIC CHOLECYSTITIS  Postoperative Diagnosis: CHOLELITHIASIS, CHRONIC CHOLECYSTITIS  Procedure: Procedure(s):  LAPAROSCOPIC CHOLECYSTECTOMY    Surgeon: Gopi Molina MD  Assistant(s): Kp Brar   Anesthesia: General   Estimated Blood Loss: 5 mL  Specimens:   ID Type Source Tests Collected by Time Destination   1 : gallbladder Fresh Gallbladder  Clemente Weber MD 12/10/2019 1114 Pathology      Findings: Numerous large stones impacted at gallbladder neck, contracted gallbladder. Normal ductal anatomy. Single cystic duct and cystic artery identified. Critical view of safety obtained. Indications: The patient presented with worsening episodes of RUQ pain and nausea. She had imaging studies including an U/S that showed gallstones, a HIDA scan that showed non-visualization of the gallbladder and a CT scan without another source being identified. Recommendation was made for laparoscopic cholecystectomy. A complete discussion of the risks, benefits and alternatives to surgery were discussed with the patient who was keen to proceed. Description of Operation: Elizabeth Khalil was identified in the pre-operative holding area. Informed consent was obtained after a complete discussion of risks benefits and alternatives to surgery were had with the patient. The patient was brought back to the OR and placed under general endotracheal anesthesia in the supine position on the operating room table with the arms extended and a footboard in place. The patient was then prepped and draped in the usual sterile fashion. A proper timeout was performed. We then injected local anesthetic into the karan-umbilical skin and subcutaneous tissue. A 5 mm infraumbilical incision was then made using an 11 blade. We dissected down to the abdominal wall fascia at the base of the umbilicus with a Kocher clamp.   This was grasped and retracted anteriorly. A Veress needle was then passed into the abdomen and a standard water-drop technique test was performed. The abdomen was insufflated to 15 mm Hg. The Veress was then removed and a 5 mm Optiview trocar was placed with the scope inserted. The abdomen was entered safely. Additional trocars including a 12 mm subxyphoid port and two 5 mm RUQ trocars were placed in usual locations for laparoscopic cholecystectomy. These were all placed after injection of local anesthetic and under direct visualization. The patient was placed in steep reverse Trendelenburg position. The dome of the gallbladder was grasped and retracted anteriorly and laterally over the liver edge. The infundibulum was grasped and retracted laterally. The gallbladder appeared contracted and chronically inflamed. Numerous large stones were noted within the gallbladder and appeared impacted at the neck/infundibulum. The triangle of Calot was then meticulously dissected by taking down the overlying peritoneum. A critical view of safety was obtained. The cystic duct and cystic artery were clearly identified and cleared of surrounding tissues. Two clips were placed on the patient side of these structures and 1 clip on the specimen side, and then both structures were divided with scissors. A third clip was placed on the cystic duct as well. We then dissected the gallbladder off of the hepatic bed using hook electrocautery. The gallbladder was passed into an endocatch bag and removed through the 12 mm subxyphoid site. The clips and gallbladder fossa were then re-examined and noted to be hemostatic. There was no spillage of bile during the case. The 12 mm subxyphoid site was then closed using an 0-vicryl suture on a suture passer to re-approximate the fascia. The abdomen was then allowed to desufflate and all remaining trocars were removed. Additional local anesthetic was injected at all incision sites.   The skin was then re-approximated using 4-0 monocryl subcuticular stitches and dermabond skin adhesive. All needle and instrument counts were correct at the completion of the case. I was present and scrubbed throughout the entirety of the case. There were no immediate complications.       Complications: None    Implants: * No implants in log *    Juan Curran MD  12/10/2019

## 2019-12-23 ENCOUNTER — OFFICE VISIT (OUTPATIENT)
Dept: SURGERY | Age: 39
End: 2019-12-23

## 2019-12-23 VITALS
TEMPERATURE: 98.3 F | DIASTOLIC BLOOD PRESSURE: 71 MMHG | SYSTOLIC BLOOD PRESSURE: 120 MMHG | HEIGHT: 65 IN | BODY MASS INDEX: 35.16 KG/M2 | WEIGHT: 211 LBS | RESPIRATION RATE: 16 BRPM | HEART RATE: 97 BPM | OXYGEN SATURATION: 97 %

## 2019-12-23 DIAGNOSIS — K80.20 GALLSTONES: ICD-10-CM

## 2019-12-23 DIAGNOSIS — Z09 POSTOPERATIVE EXAMINATION: Primary | ICD-10-CM

## 2019-12-23 DIAGNOSIS — Z90.49 S/P LAPAROSCOPIC CHOLECYSTECTOMY: ICD-10-CM

## 2019-12-23 NOTE — PROGRESS NOTES
1. Have you been to the ER, urgent care clinic since your last visit? Hospitalized since your last visit? No    2. Have you seen or consulted any other health care providers outside of the [address] [place] [address] since your last visit? Include any pap smears or colon screening.  No

## 2019-12-23 NOTE — LETTER
12/23/19 Patient: Ben Matta YOB: 1980 Date of Visit: 12/23/2019 Cosme Mancia MD 
[place] [address] [address] [facility] [id number] VIA In Basket Dear Cosme Mancia MD, Thank you for referring Ms. Shakira Rock to [facility] for evaluation. My notes for this consultation are attached. If you have questions, please do not hesitate to call me. I look forward to following your patient along with you. Sincerely, Claritza Choi NP

## 2019-12-23 NOTE — PROGRESS NOTES
Subjective:      Ginetet Porter is a 46 y.o. female presents for postop care 13 days following laparoscopic cholecystectomy by Dr. Marcus Waddell. Appetite is good. Eating a regular diet. without difficulty. Bowel movements are regular. No post prandial diarrhea. Pain is controlled without any medications. .Denies fever, nausea, shortness of breath, chest pain, redness at incision site, vomiting and diarrhea    Pathology:  Chronic cholecystitis with cholelithiasis    Advance directive not on file      Objective:     Visit Vitals  /71 (BP 1 Location: Left arm, BP Patient Position: Sitting)   Pulse 97   Temp 98.3 °F (36.8 °C) (Oral)   Resp 16   Ht 5' 4.5\" (1.638 m)   Wt 211 lb (95.7 kg)   SpO2 97%   BMI 35.66 kg/m²       General:  alert, no distress   Abdomen: soft, bowel sounds active, non-tender   Incision:   healing well, no drainage, no erythema, no seroma, no swelling, no dehiscence, incisions well approximated   Heart: regular rate and rhythm, S1, S2 normal, no murmur, click, rub or gallop   Lungs: clear to auscultation bilaterally     Assessment:     1. Chronic cholecystitis with cholelithiasis, status post lap sharonda. Doing well postoperatively. Plan:     1. Pt is to increase activities as tolerated. .  2. Follow-up: prn. Ms. Maryana Nevarez has a reminder for a \"due or due soon\" health maintenance. I have asked that she contact her primary care provider for follow-up on this health maintenance. Patient verbalized understanding and agreement.

## 2019-12-23 NOTE — PATIENT INSTRUCTIONS
Cholecystectomy: What to Expect at [facility] Your Recovery After your surgery, it is normal to feel weak and tired for several days after you return home. Your belly may be swollen. If you had laparoscopic surgery, you may also have pain in your shoulder for about 24 hours. You may have gas or need to burp a lot at first, and a few people get diarrhea. The diarrhea usually goes away in 2 to 4 weeks, but it may last longer. How quickly you recover depends on whether you had a laparoscopic or open surgery. · For a laparoscopic surgery, most people can go back to work or their normal routine in 1 to 2 weeks, but it may take longer, depending on the type of work you do. · For an open surgery, it will probably take 4 to 6 weeks before you get back to your normal routine. This care sheet gives you a general idea about how long it will take for you to recover. However, each person recovers at a different pace. Follow the steps below to get better as quickly as possible. How can you care for yourself at home? Activity 
  · Rest when you feel tired. Getting enough sleep will help you recover.  
  · Try to walk each day. Start out by walking a little more than you did the day before. Gradually increase the amount you walk. Walking boosts blood flow and helps prevent pneumonia and constipation.  
  · For about 2 to 4 weeks, avoid lifting anything that would make you strain. This may include a child, heavy grocery bags and milk containers, a heavy briefcase or backpack, cat litter or dog food bags, or a vacuum .  
  · Avoid strenuous activities, such as biking, jogging, weightlifting, and aerobic exercise, until your doctor says it is okay.  
  · You may shower 24 to 48 hours after surgery, if your doctor okays it. Pat the cut (incision) dry.  Do not take a bath for the first 2 weeks, or until your doctor tells you it is okay.  
  · You may drive when you are no longer taking pain medicine and can quickly move your foot from the gas pedal to the brake. You must also be able to sit comfortably for a long period of time, even if you do not plan to go far. You might get caught in traffic.  
  · For a laparoscopic surgery, most people can go back to work or their normal routine in 1 to 2 weeks, but it may take longer. For an open surgery, it will probably take 4 to 6 weeks before you get back to your normal routine.  
  · Your doctor will tell you when you can have sex again.  
 Diet 
  · Eat smaller meals more often instead of fewer larger meals. You can eat a normal diet, but avoid eating fatty foods for about 1 month. Fatty foods include hamburger, whole milk, cheese, and many snack foods. If your stomach is upset, try bland, low-fat foods like plain rice, broiled chicken, toast, and yogurt.  
  · Drink plenty of fluids (unless your doctor tells you not to).   · If you have diarrhea, try avoiding spicy foods, dairy products, fatty foods, and alcohol. You can also watch to see if specific foods cause it, and stop eating them. If the diarrhea continues for more than 2 weeks, talk to your doctor.  
  · You may notice that your bowel movements are not regular right after your surgery. This is common. Try to avoid constipation and straining with bowel movements. You may want to take a fiber supplement every day. If you have not had a bowel movement after a couple of days, ask your doctor about taking a mild laxative. Medicines 
  · Your doctor will tell you if and when you can restart your medicines. He or she will also give you instructions about taking any new medicines.  
  · If you take blood thinners, such as warfarin (Coumadin), clopidogrel (Plavix), or aspirin, be sure to talk to your doctor. He or she will tell you if and when to start taking those medicines again. Make sure that you understand exactly what your doctor wants you to do.  
  · Take pain medicines exactly as directed. ? If the doctor gave you a prescription medicine for pain, take it as prescribed. ? If you are not taking a prescription pain medicine, take an over-the-counter medicine such as acetaminophen (Tylenol), ibuprofen (Advil, Motrin), or naproxen (Aleve). Read and follow all instructions on the label. ? Do not take two or more pain medicines at the same time unless the doctor told you to. Many pain medicines contain acetaminophen, which is Tylenol. Too much Tylenol can be harmful.  
  · If you think your pain medicine is making you sick to your stomach: 
? Take your medicine after meals (unless your doctor tells you not to). ? Ask your doctor for a different pain medicine.  
  · If your doctor prescribed antibiotics, take them as directed. Do not stop taking them just because you feel better. You need to take the full course of antibiotics. Incision care 
  · If you have strips of tape on the incision, or cut, leave the tape on for a week or until it falls off.  
  · After 24 to 48 hours, wash the area daily with warm, soapy water, and pat it dry.  
  · You may have staples to hold the cut together. Keep them dry until your doctor takes them out. This is usually in 7 to 10 days.  
  · Keep the area clean and dry. You may cover it with a gauze bandage if it weeps or rubs against clothing. Change the bandage every day.  
 Ice 
  · To reduce swelling and pain, put ice or a cold pack on your belly for 10 to 20 minutes at a time. Do this every 1 to 2 hours. Put a thin cloth between the ice and your skin. Follow-up care is a key part of your treatment and safety. Be sure to make and go to all appointments, and call your doctor if you are having problems. It's also a good idea to know your test results and keep a list of the medicines you take. When should you call for help? Call 911 anytime you think you may need emergency care. For example, call if: 
  · You passed out (lost consciousness).   · You are short of breath. [place] your doctor now or seek immediate medical care if: 
  · You are sick to your stomach and cannot drink fluids.  
  · You have pain that does not get better when you take your pain medicine.  
  · You cannot pass stools or gas.  
  · You have signs of infection, such as: 
? Increased pain, swelling, warmth, or redness. ? Red streaks leading from the incision. ? Pus draining from the incision. ? A fever.  
  · Bright red blood has soaked through the bandage over your incision.  
  · You have loose stitches, or your incision comes open.  
  · You have signs of a blood clot in your leg (called a deep vein thrombosis), such as: 
? Pain in your calf, back of knee, thigh, or groin. ? Redness and swelling in your leg or groin.  
 Watch closely for any changes in your health, and be sure to contact your doctor if you have any problems. Where can you learn more? Go to http://flavia-boston.info/. Enter 449 72 364 in the search box to learn more about \"Cholecystectomy: What to Expect at Home. \" Current as of: November 7, 2018 Content Version: 12.2 © 1331-8015 [employer]. Care instructions adapted under license by [employer] (which disclaims liability or warranty for this information). If you have questions about a medical condition or this instruction, always ask your healthcare professional. [place] [address] any warranty or liability for your use of this information.

## 2020-01-22 ENCOUNTER — TELEPHONE (OUTPATIENT)
Dept: INTERNAL MEDICINE CLINIC | Age: 40
End: 2020-01-22

## 2020-01-22 NOTE — TELEPHONE ENCOUNTER
Not sure why the dentist made recommendation. If she needs referral then yes will need appt as we have not discussed this. She should be able to schedule herself with sleep specialist and bypass me if she wants. Give her [facility] sleep information and pulmonary associate information.

## 2020-01-22 NOTE — TELEPHONE ENCOUNTER
Patient states she saw her dentist yesterday and it was suggested she have a sleep study, because of her air flow.   Would Dr. Rodney Stinson order that for her or does she need to come in?

## 2020-01-23 NOTE — TELEPHONE ENCOUNTER
Verified patient identity with two identifiers. Spoke with patient by phone. Pt agreeable to scheduling on her own- gave her [facility] sleep information and pulmonary associate information. Patient verbalized understanding.

## 2020-03-19 ENCOUNTER — HOSPITAL ENCOUNTER (OUTPATIENT)
Dept: GENERAL RADIOLOGY | Age: 40
Discharge: HOME | End: 2020-03-19
Attending: INTERNAL MEDICINE
Payer: COMMERCIAL

## 2020-03-19 ENCOUNTER — TELEPHONE (OUTPATIENT)
Dept: INTERNAL MEDICINE CLINIC | Age: 40
End: 2020-03-19

## 2020-03-19 ENCOUNTER — OFFICE VISIT (OUTPATIENT)
Dept: INTERNAL MEDICINE CLINIC | Age: 40
End: 2020-03-19

## 2020-03-19 VITALS
OXYGEN SATURATION: 97 % | RESPIRATION RATE: 16 BRPM | DIASTOLIC BLOOD PRESSURE: 90 MMHG | HEART RATE: 73 BPM | WEIGHT: 208 LBS | BODY MASS INDEX: 34.66 KG/M2 | TEMPERATURE: 98.8 F | SYSTOLIC BLOOD PRESSURE: 140 MMHG | HEIGHT: 65 IN

## 2020-03-19 DIAGNOSIS — M79.645 PAIN OF LEFT THUMB: ICD-10-CM

## 2020-03-19 DIAGNOSIS — M79.645 PAIN OF LEFT THUMB: Primary | ICD-10-CM

## 2020-03-19 PROCEDURE — 73130 X-RAY EXAM OF HAND: CPT

## 2020-03-19 NOTE — PROGRESS NOTES
Acute Care Note    Chikis Santoro is 45 y.o. female. she presents for evaluation of left thumb pain    The patient reports left thumb pain for the past week. She denies any trauma to the area. She has noted a clicking at the thumb as well with extension. She has no pain in her forearm. Prior to Admission medications    Medication Sig Start Date End Date Taking? Authorizing Provider   naproxen sodium (ALEVE) 220 mg cap Take 1 Tab by mouth two (2) times daily as needed. Provider, Historical   fluticasone propion-salmeterol (ADVAIR/WIXELA) 100-50 mcg/dose diskus inhaler Take 1 Puff by inhalation two (2) times a day. Patient taking differently: Take 1 Puff by inhalation two (2) times daily as needed. 7/10/19   Helen Krishna MD   omeprazole (PRILOSEC) 20 mg capsule Take 1 Cap by mouth daily. 7/6/18   Helen Krishna MD   montelukast (SINGULAIR) 10 mg tablet Take 1 Tab by mouth daily. 3/28/18   Helen Krishna MD   albuterol (PROVENTIL HFA, VENTOLIN HFA, PROAIR HFA) 90 mcg/actuation inhaler Take 1 Puff by inhalation as needed for Wheezing. 3/28/18   Helen Krishna MD   fluticasone Tammy Shy) 50 mcg/actuation nasal spray 2 Sprays by Both Nostrils route daily. 3/28/18   Helen Krishna MD   guar gum (BENEFIBER) packet Take  by mouth two (2) times daily as needed. Provider, Historical   MULTIVIT WITH CALCIUM,IRON,MIN [facility] MULTIPLE VITAMINS PO) Take 1 Tab by mouth daily. Provider, Historical   levonorgestrel (MIRENA) 20 mcg/24 hour (5 years) IUD 1 Each by IntraUTERine route once. Provider, Historical   loratadine (CLARITIN) 10 mg tablet Take 10 mg by mouth daily.  Indications: ALLERGIC RHINITIS    Provider, Historical         Patient Active Problem List   Diagnosis Code    DDD (degenerative disc disease), lumbar M51.36    Asthma J45.909    GERD (gastroesophageal reflux disease) K21.9    Seasonal allergic rhinitis J30.2    Hyperlipidemia E78.5    Severe obesity (BMI 35.0-39. 9) with comorbidity ([facility] [address] E66.01    Gallstones K80.20         Review of Systems   Musculoskeletal: Positive for joint pain. Visit Vitals  Temp 98.8 °F (37.1 °C)       Physical Exam  Musculoskeletal:      Comments: Pain at the left [facility] joint with palpation. Mild edema. No erythema               ASSESSMENT/PLAN  Diagnoses and all orders for this visit:    1. Pain of left thumb - Discussed with the patient, she will need and X-ray and likely an evaluation by hand surgery. For now, we will obtain the x-ray and splint the thumb. She will use Alleve 1 tab twice a day for the next 7 days to see if the pain improves. -     XR HAND LT MIN 3 V; Future  -     REFERRAL TO ORTHOPEDICS         Advised the patient to call back or return to office if symptoms worsen/change/persist.   Discussed expected course/resolution/complications of diagnosis in detail with patient. Medication risks/benefits/costs/interactions/alternatives discussed with patient. The patient was given an after visit summary which includes diagnoses, current medications, & vitals. They expressed understanding with the diagnosis and plan.

## 2020-03-19 NOTE — PROGRESS NOTES
Verified name and birth date for privacy precautions. Chart reviewed in preparation for today's visit. Chief Complaint   Patient presents with    Finger Pain     left finger \"clicking and painful\" x 1 week           Health Maintenance Due   Topic    Pneumococcal 0-64 years (1 of 1 - PPSV23)    Influenza Age 5 to Adult          Wt Readings from Last 3 Encounters:   03/19/20 208 lb (94.3 kg)   12/23/19 211 lb (95.7 kg)   12/10/19 215 lb (97.5 kg)     Temp Readings from Last 3 Encounters:   03/19/20 98.8 °F (37.1 °C)   12/23/19 98.3 °F (36.8 °C) (Oral)   12/10/19 98 °F (36.7 °C)     BP Readings from Last 3 Encounters:   03/19/20 140/90   12/23/19 120/71   12/10/19 120/70     Pulse Readings from Last 3 Encounters:   03/19/20 73   12/23/19 97   12/10/19 61         Learning Assessment:  :     Learning Assessment 10/29/2019 3/30/2015 4/15/2014   PRIMARY LEARNER Patient Patient Patient   HIGHEST LEVEL OF EDUCATION - PRIMARY LEARNER  4 YEARS OF COLLEGE 4 YEARS OF COLLEGE 4 YEARS OF COLLEGE   BARRIERS PRIMARY LEARNER NONE NONE NONE   CO-LEARNER CAREGIVER No No No   PRIMARY LANGUAGE ENGLISH ENGLISH ENGLISH   LEARNER PREFERENCE PRIMARY DEMONSTRATION DEMONSTRATION DEMONSTRATION     PICTURES LISTENING -     VIDEOS - -     LISTENING - -     READING - -   LEARNING SPECIAL TOPICS No - -   ANSWERED BY Patient Patient Patient   RELATIONSHIP SELF SELF SELF       Depression Screening:  :     3 most recent PHQ Screens 3/19/2020   Little interest or pleasure in doing things Not at all   Feeling down, depressed, irritable, or hopeless Not at all   Total Score PHQ 2 0       Fall Risk Assessment:  :     Fall Risk Assessment, last 12 mths 3/19/2020   Able to walk? Yes   Fall in past 12 months? No       Abuse Screening:  :     Abuse Screening Questionnaire 3/19/2020 7/6/2018   Do you ever feel afraid of your partner? N N   Are you in a relationship with someone who physically or mentally threatens you?  N N   Is it safe for you to go home? Hardik Pettit       Coordination of Care Questionnaire:  :     1) Have you been to an emergency room, urgent care clinic since your last visit? Yes Hospitalized since your last visit? no             2) Have you seen or consulted any other health care providers outside of [address] [place] [address] since your last visit?  yes  (Include any pap smears or colon screenings in this section.)    3) Do you have an Advance Directive on file? no        ------------------------------------------------

## 2020-08-24 ENCOUNTER — OFFICE VISIT (OUTPATIENT)
Dept: INTERNAL MEDICINE CLINIC | Age: 40
End: 2020-08-24
Payer: COMMERCIAL

## 2020-08-24 VITALS
RESPIRATION RATE: 16 BRPM | HEART RATE: 82 BPM | SYSTOLIC BLOOD PRESSURE: 109 MMHG | OXYGEN SATURATION: 97 % | BODY MASS INDEX: 35.16 KG/M2 | DIASTOLIC BLOOD PRESSURE: 72 MMHG | WEIGHT: 211 LBS | TEMPERATURE: 96.2 F | HEIGHT: 65 IN

## 2020-08-24 DIAGNOSIS — R21 RASH: Primary | ICD-10-CM

## 2020-08-24 DIAGNOSIS — B37.31 VAGINAL YEAST INFECTION: ICD-10-CM

## 2020-08-24 PROCEDURE — 99213 OFFICE O/P EST LOW 20 MIN: CPT | Performed by: NURSE PRACTITIONER

## 2020-08-24 RX ORDER — CLOTRIMAZOLE AND BETAMETHASONE DIPROPIONATE 10; .64 MG/G; MG/G
CREAM TOPICAL 2 TIMES DAILY
Qty: 15 G | Refills: 0 | Status: SHIPPED | OUTPATIENT
Start: 2020-08-24

## 2020-08-24 RX ORDER — FLUCONAZOLE 150 MG/1
150 TABLET ORAL
Qty: 1 TAB | Refills: 1 | Status: SHIPPED | OUTPATIENT
Start: 2020-08-24 | End: 2020-08-24

## 2020-08-24 NOTE — PROGRESS NOTES
Ana Cristina Rose is a 45 y.o. female who was seen in clinic today (8/24/2020) for an acute visit. Assessment & Plan:     Diagnoses and all orders for this visit:    1. Rash    2. Vaginal yeast infection    Other orders  -     fluconazole (DIFLUCAN) 150 mg tablet; Take 1 Tab by mouth once as needed (vaginal yeast infection) for up to 1 dose. FDA advises cautious prescribing of oral fluconazole in pregnancy. May repeat dose in 3 days if needed per refill.  -     clotrimazole-betamethasone (LOTRISONE) topical cream; Apply  to affected area two (2) times a day. Rash with tinea appearance-Lotrisone cream as directed X 2 weeks-educated about med. Aware to notify office if no improvement or does not resolve. Vaginal yeast infection-some improvement with OTC. Will place on Diflucan 150 mg X 1-may repeat dose in 3 days if needed. Educated about med. Aware to notify the office if does not completely resolve. Annual physical due-encouraged pt to make appt with Dr. Darcy Bonilla in the next couple of months. Follow-up and Dispositions    · Return in about 2 months (around 10/24/2020), or if symptoms worsen or fail to improve, for annual physical well exam with Dr. Darcy Bonilla. Subjective:   Shakira was seen today for Dry Skin  Rash-like with itching and raised/rough on left arm and base of neck-Thought maybe eczema but not improving with applying moisturizers. Also complains of vaginal yeast infection-used OTC Monistat with some improvement but continues to have some itching and scant amt white discharge. Denies fever, chills, known contacts with rash, change in soap/detergent, GI/ complaints, or foul discharge. Prior to Admission medications    Medication Sig Start Date End Date Taking? Authorizing Provider   fluconazole (DIFLUCAN) 150 mg tablet Take 1 Tab by mouth once as needed (vaginal yeast infection) for up to 1 dose. FDA advises cautious prescribing of oral fluconazole in pregnancy.  May repeat dose in 3 days if needed per refill. 8/24/20 8/24/20 Yes Ladena Median, FNP   clotrimazole-betamethasone (LOTRISONE) topical cream Apply  to affected area two (2) times a day. 8/24/20  Yes Ladena Median, FNP   naproxen sodium (ALEVE) 220 mg cap Take 1 Tab by mouth two (2) times daily as needed. Yes Provider, Historical   fluticasone propion-salmeterol (ADVAIR/WIXELA) 100-50 mcg/dose diskus inhaler Take 1 Puff by inhalation two (2) times a day. Patient taking differently: Take 1 Puff by inhalation two (2) times daily as needed. 7/10/19  Yes Bishnu Samaniego MD   omeprazole (PRILOSEC) 20 mg capsule Take 1 Cap by mouth daily. 7/6/18  Yes Bishnu Samaniego MD   montelukast (SINGULAIR) 10 mg tablet Take 1 Tab by mouth daily. 3/28/18  Yes Bishnu Samaniego MD   albuterol (PROVENTIL HFA, VENTOLIN HFA, PROAIR HFA) 90 mcg/actuation inhaler Take 1 Puff by inhalation as needed for Wheezing. 3/28/18  Yes Bishnu Samaniego MD   fluticasone (FLONASE) 50 mcg/actuation nasal spray 2 Sprays by Both Nostrils route daily. 3/28/18  Yes Bishnu Samaniego MD   MULTIVIT WITH CALCIUM,IRON,MIN [facility] MULTIPLE VITAMINS PO) Take 1 Tab by mouth daily. Yes Provider, Historical   levonorgestrel (MIRENA) 20 mcg/24 hour (5 years) IUD 1 Each by IntraUTERine route once. Yes Provider, Historical   loratadine (CLARITIN) 10 mg tablet Take 10 mg by mouth daily. Indications: ALLERGIC RHINITIS   Yes Provider, Historical   guar gum (BENEFIBER) packet Take  by mouth two (2) times daily as needed. 8/24/20  Provider, Historical          No Known Allergies        ROS - per HPI        Objective:   Physical Exam  Vitals signs and nursing note reviewed. Constitutional:       Appearance: Normal appearance. She is not ill-appearing. HENT:      Head: Normocephalic and atraumatic. Eyes:      General: No scleral icterus. Pulmonary:      Effort: Pulmonary effort is normal.      Breath sounds: Normal breath sounds. No wheezing. Abdominal:      Palpations: Abdomen is soft. Tenderness: There is no abdominal tenderness. Skin:     General: Skin is warm and dry. Findings: Rash present. Rash is purpuric and scaling. Rash is not crusting, pustular or vesicular. Comments: Rash also with raised slightly hypopigmented appearance-right forearm and base of neck. Neurological:      Mental Status: She is alert. Psychiatric:         Mood and Affect: Mood normal.         Behavior: Behavior normal.           Visit Vitals  /72   Pulse 82   Temp (!) 96.2 °F (35.7 °C) (Temporal)   Resp 16   Ht 5' 4.5\" (1.638 m)   Wt 211 lb (95.7 kg)   SpO2 97%   BMI 35.66 kg/m²         Disclaimer:  Advised her to call back or return to office if symptoms worsen/change/persist.  Discussed expected course/resolution/complications of diagnosis in detail with patient. Medication risks/benefits/costs/interactions/alternatives discussed with patient. She was given an after visit summary which includes diagnoses, current medications, & vitals. She expressed understanding and agrees with the diagnosis and plan.       MIGUEL A Agustin

## 2020-08-24 NOTE — PATIENT INSTRUCTIONS
Rash: Care Instructions Your Care Instructions A rash is any irritation or inflammation of the skin. Rashes have many possible causes, including allergy, infection, illness, heat, and emotional stress. Follow-up care is a key part of your treatment and safety. Be sure to make and go to all appointments, and call your doctor if you are having problems. It's also a good idea to know your test results and keep a list of the medicines you take. How can you care for yourself at home? · Wash the area with water only. Soap can make dryness and itching worse. Pat dry. · Put cold, wet cloths on the rash to reduce itching. · Keep cool, and stay out of the sun. · Leave the rash open to the air as much of the time as possible. · Sometimes petroleum jelly (Vaseline) can help relieve the discomfort caused by a rash. A moisturizing lotion, such as Cetaphil, also may help. Calamine lotion may help for rashes caused by contact with something (such as a plant or soap) that irritated the skin. Use it 3 or 4 times a day. · If your doctor prescribed a cream, use it as directed. If your doctor prescribed medicine, take it exactly as directed. · If your rash itches so badly that it interferes with your normal activities, take an over-the-counter antihistamine, such as diphenhydramine (Benadryl) or loratadine (Claritin). Read and follow all instructions on the label. When should you call for help? Call your doctor now or seek immediate medical care if: 
· You have signs of infection, such as: 
? Increased pain, swelling, warmth, or redness. ? Red streaks leading from the area. ? Pus draining from the area. ? A fever. · You have joint pain along with the rash. Watch closely for changes in your health, and be sure to contact your doctor if: 
· Your rash is changing or getting worse. For example, call if you have pain along with the rash, the rash is spreading, or you have new blisters. · You do not get better after 1 week. Where can you learn more? Go to http://flavia-boston.info/ Enter C184 in the search box to learn more about \"Rash: Care Instructions. \" Current as of: October 31, 2019               Content Version: 12.5 © 9032-3980 [employer]. Care instructions adapted under license by [employer] (which disclaims liability or warranty for this information). If you have questions about a medical condition or this instruction, always ask your healthcare professional. [address] any warranty or liability for your use of this information. Vaginal Yeast Infection: Care Instructions Your Care Instructions A vaginal yeast infection is caused by too many yeast cells in the vagina. This is common in women of all ages. Itching, vaginal discharge and irritation, and other symptoms can bother you. But yeast infections don't often cause other health problems. Some medicines can increase your risk of getting a yeast infection. These include antibiotics, birth control pills, hormones, and steroids. You may also be more likely to get a yeast infection if you are pregnant, have diabetes, douche, or wear tight clothes. With treatment, most yeast infections get better in 2 to 3 days. Follow-up care is a key part of your treatment and safety. Be sure to make and go to all appointments, and call your doctor if you are having problems. It's also a good idea to know your test results and keep a list of the medicines you take. How can you care for yourself at home? · Take your medicines exactly as prescribed. Call your doctor if you think you are having a problem with your medicine. · Ask your doctor about over-the-counter (OTC) medicines for yeast infections. They may cost less than prescription medicines. If you use an OTC treatment, read and follow all instructions on the label. · Do not use tampons while using a vaginal cream or suppository. The tampons can absorb the medicine. Use pads instead. · Wear loose cotton clothing. Do not wear nylon or other fabric that holds body heat and moisture close to the skin. · Try sleeping without underwear. · Do not scratch. Relieve itching with a cold pack or a cool bath. · Do not wash your vaginal area more than once a day. Use plain water or a mild, unscented soap. Air-dry the vaginal area. · Change out of wet swimsuits after swimming. · Do not have sex until you have finished your treatment. · Do not douche. When should you call for help? Call your doctor now or seek immediate medical care if: 
· You have unexpected vaginal bleeding. · You have new or increased pain in your vagina or pelvis. Watch closely for changes in your health, and be sure to contact your doctor if: 
· You have a fever. · You are not getting better after 2 days. · Your symptoms come back after you finish your medicines. Where can you learn more? Go to http://flavia-boston.info/ Enter W825 in the search box to learn more about \"Vaginal Yeast Infection: Care Instructions. \" Current as of: November 8, 2019               Content Version: 12.5 © 4201-7137 [employer]. Care instructions adapted under license by [employer] (which disclaims liability or warranty for this information). If you have questions about a medical condition or this instruction, always ask your healthcare professional. [place] [address] any warranty or liability for your use of this information.

## 2020-12-22 ENCOUNTER — HOSPITAL ENCOUNTER (OUTPATIENT)
Dept: GENERAL RADIOLOGY | Age: 40
Discharge: HOME | End: 2020-12-22
Attending: INTERNAL MEDICINE
Payer: COMMERCIAL

## 2020-12-22 ENCOUNTER — TRANSCRIBE ORDER (OUTPATIENT)
Dept: GENERAL RADIOLOGY | Age: 40
End: 2020-12-22

## 2020-12-22 DIAGNOSIS — R06.02 SHORTNESS OF BREATH: ICD-10-CM

## 2020-12-22 DIAGNOSIS — R06.02 SHORTNESS OF BREATH: Primary | ICD-10-CM

## 2020-12-22 PROCEDURE — 71046 X-RAY EXAM CHEST 2 VIEWS: CPT

## 2021-11-17 ENCOUNTER — OFFICE VISIT (OUTPATIENT)
Dept: INTERNAL MEDICINE CLINIC | Age: 41
End: 2021-11-17
Payer: COMMERCIAL

## 2021-11-17 VITALS
TEMPERATURE: 97 F | RESPIRATION RATE: 12 BRPM | OXYGEN SATURATION: 98 % | DIASTOLIC BLOOD PRESSURE: 74 MMHG | WEIGHT: 219 LBS | SYSTOLIC BLOOD PRESSURE: 112 MMHG | HEART RATE: 72 BPM | BODY MASS INDEX: 37.39 KG/M2 | HEIGHT: 64 IN

## 2021-11-17 DIAGNOSIS — E66.01 MORBID OBESITY (HCC): ICD-10-CM

## 2021-11-17 DIAGNOSIS — M54.42 CHRONIC MIDLINE LOW BACK PAIN WITH BILATERAL SCIATICA: ICD-10-CM

## 2021-11-17 DIAGNOSIS — J30.2 SEASONAL ALLERGIC RHINITIS, UNSPECIFIED TRIGGER: ICD-10-CM

## 2021-11-17 DIAGNOSIS — M54.9 CHRONIC UPPER BACK PAIN: ICD-10-CM

## 2021-11-17 DIAGNOSIS — M54.41 CHRONIC MIDLINE LOW BACK PAIN WITH BILATERAL SCIATICA: ICD-10-CM

## 2021-11-17 DIAGNOSIS — J45.20 MILD INTERMITTENT ASTHMA WITHOUT COMPLICATION: ICD-10-CM

## 2021-11-17 DIAGNOSIS — G56.03 BILATERAL CARPAL TUNNEL SYNDROME: ICD-10-CM

## 2021-11-17 DIAGNOSIS — G89.29 CHRONIC MIDLINE LOW BACK PAIN WITH BILATERAL SCIATICA: ICD-10-CM

## 2021-11-17 DIAGNOSIS — Z00.00 ROUTINE PHYSICAL EXAMINATION: Primary | ICD-10-CM

## 2021-11-17 DIAGNOSIS — G89.29 CHRONIC UPPER BACK PAIN: ICD-10-CM

## 2021-11-17 DIAGNOSIS — K21.9 GASTROESOPHAGEAL REFLUX DISEASE, UNSPECIFIED WHETHER ESOPHAGITIS PRESENT: ICD-10-CM

## 2021-11-17 PROBLEM — Z90.49 HISTORY OF CHOLECYSTECTOMY: Status: ACTIVE | Noted: 2019-10-30

## 2021-11-17 PROCEDURE — 99396 PREV VISIT EST AGE 40-64: CPT | Performed by: INTERNAL MEDICINE

## 2021-11-17 RX ORDER — MELATONIN
2000 DAILY
COMMUNITY
Start: 2021-11-15

## 2021-11-17 RX ORDER — DICLOFENAC SODIUM 10 MG/G
GEL TOPICAL AS NEEDED
COMMUNITY
Start: 2021-04-28

## 2021-11-17 RX ORDER — MELOXICAM 15 MG/1
15 TABLET ORAL
COMMUNITY
Start: 2020-12-02

## 2021-11-17 NOTE — ASSESSMENT & PLAN NOTE
Chronic issue, new to me, symptomatic, she is asking about an EMG. I reviewed how the test is done and what the results will tell us. I'm not sure it will give her the answers she wants, but offered to order it. She will discuss with her  and let me know if she needs/wants it.

## 2021-11-17 NOTE — ASSESSMENT & PLAN NOTE
well controlled, continue current treatment, using medications prn (once every 2-3 wks and she knows triggers)

## 2021-11-17 NOTE — ASSESSMENT & PLAN NOTE
Chronic issue, new to me, symptomatic, will defer to specialist, did review if they offered surgery I would consider, longer the nerve is damaged less likely full recovery can be achieved

## 2021-11-17 NOTE — PATIENT INSTRUCTIONS
Well Visit, Ages 25 to 48: Care Instructions  Overview     Well visits can help you stay healthy. Your doctor has checked your overall health and may have suggested ways to take good care of yourself. Your doctor also may have recommended tests. At home, you can help prevent illness with healthy eating, regular exercise, and other steps. Follow-up care is a key part of your treatment and safety. Be sure to make and go to all appointments, and call your doctor if you are having problems. It's also a good idea to know your test results and keep a list of the medicines you take. How can you care for yourself at home? · Get screening tests that you and your doctor decide on. Screening helps find diseases before any symptoms appear. · Eat healthy foods. Choose fruits, vegetables, whole grains, protein, and low-fat dairy foods. Limit fat, especially saturated fat. Reduce salt in your diet. · Limit alcohol. If you are a man, have no more than 2 drinks a day or 14 drinks a week. If you are a woman, have no more than 1 drink a day or 7 drinks a week. · Get at least 30 minutes of physical activity on most days of the week. Walking is a good choice. You also may want to do other activities, such as running, swimming, cycling, or playing tennis or team sports. Discuss any changes in your exercise program with your doctor. · Reach and stay at a healthy weight. This will lower your risk for many problems, such as obesity, diabetes, heart disease, and high blood pressure. · Do not smoke or allow others to smoke around you. If you need help quitting, talk to your doctor about stop-smoking programs and medicines. These can increase your chances of quitting for good. · Care for your mental health. It is easy to get weighed down by worry and stress. Learn strategies to manage stress, like deep breathing and mindfulness, and stay connected with your family and community.  If you find you often feel sad or hopeless, talk with your doctor. Treatment can help. · Talk to your doctor about whether you have any risk factors for sexually transmitted infections (STIs). You can help prevent STIs if you wait to have sex with a new partner (or partners) until you've each been tested for STIs. It also helps if you use condoms (male or female condoms) and if you limit your sex partners to one person who only has sex with you. Vaccines are available for some STIs, such as HPV. · Use birth control if it's important to you to prevent pregnancy. Talk with your doctor about the choices available and what might be best for you. · If you think you may have a problem with alcohol or drug use, talk to your doctor. This includes prescription medicines (such as amphetamines and opioids) and illegal drugs (such as cocaine and methamphetamine). Your doctor can help you figure out what type of treatment is best for you. · Protect your skin from too much sun. When you're outdoors from 10 a.m. to 4 p.m., stay in the shade or cover up with clothing and a hat with a wide brim. Wear sunglasses that block UV rays. Even when it's cloudy, put broad-spectrum sunscreen (SPF 30 or higher) on any exposed skin. · See a dentist one or two times a year for checkups and to have your teeth cleaned. · Wear a seat belt in the car. When should you call for help? Watch closely for changes in your health, and be sure to contact your doctor if you have any problems or symptoms that concern you. Where can you learn more? Go to http://www.[employer].com/  Enter P072 in the search box to learn more about \"Well Visit, Ages 25 to 48: Care Instructions. \"  Current as of: February 11, 2021               Content Version: 13.0  © 5969-5592 Healthwise, Incorporated. Care instructions adapted under license by [employer] (which disclaims liability or warranty for this information).  If you have questions about a medical condition or this instruction, always ask your healthcare professional. [place] [address] any warranty or liability for your use of this information.

## 2021-11-17 NOTE — PROGRESS NOTES
Sandy Patino is a 37 y.o. female who was seen in clinic today (11/17/2021) for a full physical.       Assessment & Plan:   Below is the assessment and plan developed based on review of pertinent history, physical exam, labs, studies, and medications. 1. Routine physical examination  Comments:  labs from the [facility] reviewed, no concerns, will get scanned into her chart  2. Bilateral carpal tunnel syndrome  Assessment & Plan:  Chronic issue, new to me, symptomatic, will defer to specialist, did review if they offered surgery I would consider, longer the nerve is damaged less likely full recovery can be achieved   3. Chronic midline low back pain with bilateral sciatica  Assessment & Plan:  Chronic issue, new to me, symptomatic, she is asking about an EMG. I reviewed how the test is done and what the results will tell us. I'm not sure it will give her the answers she wants, but offered to order it. She will discuss with her  and let me know if she needs/wants it. 4. Chronic upper back pain  Assessment & Plan:  Chronic issue, new to me, will defer to specialist   5. Gastroesophageal reflux disease, unspecified whether esophagitis present  Assessment & Plan:  well controlled, continue current treatment, using medications prn (once every 2-3 wks and she knows triggers)   6. Mild intermittent asthma without complication  Assessment & Plan:  well controlled, continue current treatment, getting meds thru [facility], will defer to them, only using medications prn  7. Seasonal allergic rhinitis, unspecified trigger  Assessment & Plan:  well controlled, continue current treatment, she is getting thru the [facility]   8. Morbid obesity ([address]  Assessment & Plan:  stable, poorly controlled, needs improvement, I have recommended the following interventions: encourage exercise and lifestyle education regarding diet. Follow-up and Dispositions    · Return in about 2 years (around 11/17/2023) for FULL PHYSICAL - 30 minutes. Subjective:   Shakira is here today for a full physical.      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 She is asking about getting an EMG due to bilateral sciatica. Recommended by her . Health Maintenance  Immunizations:   Covid: up to date  Influenza: up to date   Tetanus: she reports UTD thru the [facility], she will get me records. Cancer screening:   Cervical: reviewed guidelines, UTD, done by OBGYN, records requested  Breast: reviewed guidelines, not up to date - she has appointment scheduled next month, ordered thru OBGYN (12/29). The following sections were reviewed & updated as appropriate: Problem List, Allergies, PMH, PSH, FH, and SH. Prior to Admission medications    Medication Sig Start Date End Date Taking? Authorizing Provider   cholecalciferol (VITAMIN D3) (1000 Units /25 mcg) tablet Take 2,000 Units by mouth daily. 11/15/21  Yes Provider, Historical   diclofenac (VOLTAREN) 1 % gel as needed. 4/28/21  Yes Provider, Historical   meloxicam (MOBIC) 15 mg tablet Take 15 mg by mouth daily as needed. Or Naproxen 12/2/20  Yes Provider, Historical   clotrimazole-betamethasone (LOTRISONE) topical cream Apply  to affected area two (2) times a day. 8/24/20  Yes MIGUEL A Gerardo   naproxen sodium (ALEVE) 220 mg cap Take 1 Tab by mouth two (2) times daily as needed. Yes Provider, Historical   fluticasone propion-salmeterol (ADVAIR/WIXELA) 100-50 mcg/dose diskus inhaler Take 1 Puff by inhalation two (2) times a day. Patient taking differently: Take 1 Puff by inhalation two (2) times daily as needed. 7/10/19  Yes Edd Delgadillo MD   omeprazole (PRILOSEC) 20 mg capsule Take 1 Cap by mouth daily. 7/6/18  Yes Edd Delgadillo MD   montelukast (SINGULAIR) 10 mg tablet Take 1 Tab by mouth daily.  3/28/18  Yes Edd Delgadillo MD   albuterol (PROVENTIL HFA, VENTOLIN HFA, PROAIR HFA) 90 mcg/actuation inhaler Take 1 Puff by inhalation as needed for Wheezing. 3/28/18  Yes Taj Ryan MD   fluticasone [facility]) 50 mcg/actuation nasal spray 2 Sprays by Both Nostrils route daily. 3/28/18  Yes Taj Ryan MD   MULTIVIT WITH CALCIUM,IRON,MIN [facility] MULTIPLE VITAMINS PO) Take 1 Tab by mouth daily. Yes Provider, Historical   levonorgestrel (MIRENA) 20 mcg/24 hour (5 years) IUD 1 Each by IntraUTERine route once. Yes Provider, Historical   loratadine (CLARITIN) 10 mg tablet Take 10 mg by mouth daily. Indications: ALLERGIC RHINITIS   Yes Provider, Historical          Review of Systems   Constitutional: Negative for chills and fever. Respiratory: Negative for cough and shortness of breath. Cardiovascular: Negative for chest pain and palpitations. Gastrointestinal: Negative for abdominal pain, blood in stool, constipation, diarrhea, heartburn, nausea and vomiting. Musculoskeletal: Negative for joint pain and myalgias. Neurological: Positive for tingling and sensory change. Negative for focal weakness and headaches. In both hands and both legs. Legs are from the back to mid thigh. Endo/Heme/Allergies: Does not bruise/bleed easily. Psychiatric/Behavioral: Negative for depression. The patient is not nervous/anxious and does not have insomnia. Objective:   Physical Exam  Constitutional:       General: She is not in acute distress. Appearance: She is well-developed. She is obese. HENT:      Right Ear: Tympanic membrane and ear canal normal.      Left Ear: Tympanic membrane and ear canal normal.   Eyes:      Conjunctiva/sclera: Conjunctivae normal.   Cardiovascular:      Rate and Rhythm: Regular rhythm. Heart sounds: Normal heart sounds. No murmur heard. Pulmonary:      Effort: Pulmonary effort is normal.      Breath sounds: Normal breath sounds. Abdominal:      General: Bowel sounds are normal.      Palpations: Abdomen is soft. Tenderness: There is no abdominal tenderness. Musculoskeletal:      Right lower leg: No edema. Left lower leg: No edema. Lymphadenopathy:      Cervical: No cervical adenopathy.    Psychiatric:         Mood and Affect: Mood and affect normal.            Visit Vitals  /74   Pulse 72   Temp 97 °F (36.1 °C) (Temporal)   Resp 12   Ht 5' 4.2\" (1.631 m)   Wt 219 lb (99.3 kg)   SpO2 98%   BMI 37.36 kg/m²          Ashley Clark MD

## 2021-11-17 NOTE — ASSESSMENT & PLAN NOTE
well controlled, continue current treatment, getting meds thru [facility], will defer to them, only using medications prn

## 2022-01-25 ENCOUNTER — TRANSCRIBE ORDER (OUTPATIENT)
Dept: SCHEDULING | Age: 42
End: 2022-01-25

## 2022-01-25 DIAGNOSIS — M54.2 NECK PAIN: ICD-10-CM

## 2022-01-25 DIAGNOSIS — M54.16 LUMBAR RADICULOPATHY: ICD-10-CM

## 2022-01-25 DIAGNOSIS — M54.59 MECHANICAL LOW BACK PAIN: Primary | ICD-10-CM

## 2022-02-08 ENCOUNTER — HOSPITAL ENCOUNTER (OUTPATIENT)
Dept: MRI IMAGING | Age: 42
Discharge: HOME | End: 2022-02-08
Attending: ORTHOPAEDIC SURGERY
Payer: COMMERCIAL

## 2022-02-08 DIAGNOSIS — M54.59 MECHANICAL LOW BACK PAIN: ICD-10-CM

## 2022-02-08 DIAGNOSIS — M54.16 LUMBAR RADICULOPATHY: ICD-10-CM

## 2022-02-08 DIAGNOSIS — M54.2 NECK PAIN: ICD-10-CM

## 2022-02-08 PROCEDURE — 72148 MRI LUMBAR SPINE W/O DYE: CPT

## 2022-02-23 ENCOUNTER — TRANSCRIBE ORDER (OUTPATIENT)
Dept: SCHEDULING | Age: 42
End: 2022-02-23

## 2022-02-23 DIAGNOSIS — M54.12 CERVICAL RADICULOPATHY: Primary | ICD-10-CM

## 2022-02-23 DIAGNOSIS — M47.812 ARTHROPATHY OF CERVICAL FACET JOINT: ICD-10-CM

## 2022-02-23 DIAGNOSIS — M50.30 DDD (DEGENERATIVE DISC DISEASE), CERVICAL: ICD-10-CM

## 2022-03-07 ENCOUNTER — HOSPITAL ENCOUNTER (OUTPATIENT)
Dept: MRI IMAGING | Age: 42
Discharge: HOME | End: 2022-03-07
Attending: ORTHOPAEDIC SURGERY
Payer: COMMERCIAL

## 2022-03-07 DIAGNOSIS — M47.812 ARTHROPATHY OF CERVICAL FACET JOINT: ICD-10-CM

## 2022-03-07 DIAGNOSIS — M54.12 CERVICAL RADICULOPATHY: ICD-10-CM

## 2022-03-07 DIAGNOSIS — M50.30 DDD (DEGENERATIVE DISC DISEASE), CERVICAL: ICD-10-CM

## 2022-03-07 PROCEDURE — 72141 MRI NECK SPINE W/O DYE: CPT

## 2022-03-19 PROBLEM — E66.01 MORBID OBESITY (HCC): Status: ACTIVE | Noted: 2018-03-28

## 2022-03-19 PROBLEM — G56.03 BILATERAL CARPAL TUNNEL SYNDROME: Status: ACTIVE | Noted: 2021-11-17

## 2022-03-20 PROBLEM — G89.29 CHRONIC UPPER BACK PAIN: Status: ACTIVE | Noted: 2021-11-17

## 2022-03-20 PROBLEM — M54.9 CHRONIC UPPER BACK PAIN: Status: ACTIVE | Noted: 2021-11-17

## 2022-03-20 PROBLEM — M54.41 CHRONIC MIDLINE LOW BACK PAIN WITH BILATERAL SCIATICA: Status: ACTIVE | Noted: 2021-11-17

## 2022-03-20 PROBLEM — Z90.49 HISTORY OF CHOLECYSTECTOMY: Status: ACTIVE | Noted: 2019-10-30

## 2022-03-20 PROBLEM — M54.42 CHRONIC MIDLINE LOW BACK PAIN WITH BILATERAL SCIATICA: Status: ACTIVE | Noted: 2021-11-17

## 2022-03-20 PROBLEM — G89.29 CHRONIC MIDLINE LOW BACK PAIN WITH BILATERAL SCIATICA: Status: ACTIVE | Noted: 2021-11-17

## 2022-05-19 LAB — SARS-COV-2, NAA: NEGATIVE

## 2022-08-31 ENCOUNTER — VIRTUAL VISIT (OUTPATIENT)
Dept: INTERNAL MEDICINE CLINIC | Age: 42
End: 2022-08-31
Payer: COMMERCIAL

## 2022-08-31 DIAGNOSIS — K21.9 GASTROESOPHAGEAL REFLUX DISEASE WITHOUT ESOPHAGITIS: ICD-10-CM

## 2022-08-31 DIAGNOSIS — R42 DIZZINESS: ICD-10-CM

## 2022-08-31 DIAGNOSIS — R53.83 FATIGUE, UNSPECIFIED TYPE: Primary | ICD-10-CM

## 2022-08-31 DIAGNOSIS — Z13.220 LIPID SCREENING: ICD-10-CM

## 2022-08-31 PROCEDURE — 99213 OFFICE O/P EST LOW 20 MIN: CPT | Performed by: NURSE PRACTITIONER

## 2022-08-31 NOTE — PROGRESS NOTES
Mayte Higginbotham is a 40 y.o. female who was seen by synchronous (real-time) audio-video technology on 8/31/2022. Assessment & Plan:   Below is the assessment and plan developed based on review of pertinent history, physical exam (if applicable), labs, studies, and medications. 1. Fatigue, unspecified type  -     CBC WITH AUTOMATED DIFF; Future  -     METABOLIC PANEL, COMPREHENSIVE; Future  -     TSH 3RD GENERATION; Future  2. Dizziness  -     CBC WITH AUTOMATED DIFF; Future  -     METABOLIC PANEL, COMPREHENSIVE; Future  -     TSH 3RD GENERATION; Future  3. Lipid screening  -     LIPID PANEL; Future  4. Gastroesophageal reflux disease without esophagitis  Labs ordered  Omeprazole 20 mg BID x 2 weeks  Obtain home BP readings and report over the next few weeks  Follow-up in 1 month  I spent at least 23 minutes on this visit with this established patient. ([id number])  Subjective:   Mayte Higginbotham was seen for GI Problem      Patient reports gi upset, nausea, heartburn, and dizziness for a few months. She notes dizziness occurs with position changes. She experiences fatigue after bowel movements, which are often loose stools. No blood reported. Symptoms started about 3 months. She is taking omeprazole 20 mg once daily x 1 week. She notes mild improvement. It has helped with nighttime heartburn relief. She tries to limit high fat, spicy, and greasy foods. She has not checked her blood pressure. She notes increased urinary frequency, but no dysuria or odor reported. No fever, chills, night sweats reported. Prior to Admission medications    Medication Sig Start Date End Date Taking? Authorizing Provider   cholecalciferol (VITAMIN D3) (1000 Units /25 mcg) tablet Take 2,000 Units by mouth daily. 11/15/21   Provider, Historical   diclofenac (VOLTAREN) 1 % gel as needed. 4/28/21   Provider, Historical   meloxicam (MOBIC) 15 mg tablet Take 15 mg by mouth daily as needed.  Or Naproxen 12/2/20   Provider, Historical clotrimazole-betamethasone (LOTRISONE) topical cream Apply  to affected area two (2) times a day. 8/24/20   MIGUEL A Razo   naproxen sodium (ALEVE) 220 mg cap Take 1 Tab by mouth two (2) times daily as needed. Provider, Historical   fluticasone propion-salmeterol (ADVAIR/WIXELA) 100-50 mcg/dose diskus inhaler Take 1 Puff by inhalation two (2) times a day. Patient taking differently: Take 1 Puff by inhalation two (2) times daily as needed. 7/10/19   Dougie Newman MD   omeprazole (PRILOSEC) 20 mg capsule Take 1 Cap by mouth daily. 7/6/18   Dougie Newman MD   montelukast (SINGULAIR) 10 mg tablet Take 1 Tab by mouth daily. 3/28/18   Dougie Newman MD   albuterol (PROVENTIL HFA, VENTOLIN HFA, PROAIR HFA) 90 mcg/actuation inhaler Take 1 Puff by inhalation as needed for Wheezing. 3/28/18   Dougie Newman MD   fluticasone [facility]) 50 mcg/actuation nasal spray 2 Sprays by Both Nostrils route daily. 3/28/18   Dougie Newman MD   MULTIVIT WITH CALCIUM,IRON,MIN [facility] MULTIPLE VITAMINS PO) Take 1 Tab by mouth daily. Provider, Historical   levonorgestrel (MIRENA) 20 mcg/24 hour (5 years) IUD 1 Each by IntraUTERine route once. Provider, Historical   loratadine (CLARITIN) 10 mg tablet Take 10 mg by mouth daily.  Indications: ALLERGIC RHINITIS    Provider, Historical       Patient Active Problem List   Diagnosis Code    DDD (degenerative disc disease), lumbar M51.36    Asthma J45.909    GERD (gastroesophageal reflux disease) K21.9    Seasonal allergic rhinitis J30.2    Morbid obesity ([facility] [address] E66.01    History of cholecystectomy Z90.49    Bilateral carpal tunnel syndrome G56.03    Chronic midline low back pain with bilateral sciatica M54.41, M54.42, G89.29    Chronic upper back pain M54.9, G89.29     Patient Active Problem List    Diagnosis Date Noted    Bilateral carpal tunnel syndrome 11/17/2021    Chronic midline low back pain with bilateral sciatica 11/17/2021    Chronic upper back pain 11/17/2021    History of cholecystectomy 10/30/2019    Morbid obesity ([facility] [address] 03/28/2018    DDD (degenerative disc disease), lumbar 04/13/2011    Asthma 04/13/2011    GERD (gastroesophageal reflux disease) 04/13/2011    Seasonal allergic rhinitis 04/13/2011     Current Outpatient Medications   Medication Sig Dispense Refill    cholecalciferol (VITAMIN D3) (1000 Units /25 mcg) tablet Take 2,000 Units by mouth daily. diclofenac (VOLTAREN) 1 % gel as needed. meloxicam (MOBIC) 15 mg tablet Take 15 mg by mouth daily as needed. Or Naproxen      clotrimazole-betamethasone (LOTRISONE) topical cream Apply  to affected area two (2) times a day. 15 g 0    naproxen sodium (ALEVE) 220 mg cap Take 1 Tab by mouth two (2) times daily as needed. fluticasone propion-salmeterol (ADVAIR/WIXELA) 100-50 mcg/dose diskus inhaler Take 1 Puff by inhalation two (2) times a day. (Patient taking differently: Take 1 Puff by inhalation two (2) times daily as needed.) 1 Inhaler 2    omeprazole (PRILOSEC) 20 mg capsule Take 1 Cap by mouth daily. 90 Cap 1    montelukast (SINGULAIR) 10 mg tablet Take 1 Tab by mouth daily. 90 Tab 3    albuterol (PROVENTIL HFA, VENTOLIN HFA, PROAIR HFA) 90 mcg/actuation inhaler Take 1 Puff by inhalation as needed for Wheezing. 3 Inhaler 1    fluticasone (FLONASE) 50 mcg/actuation nasal spray 2 Sprays by Both Nostrils route daily. 3 Bottle 3    MULTIVIT WITH CALCIUM,IRON,MIN (WOMEN'S MULTIPLE VITAMINS PO) Take 1 Tab by mouth daily. levonorgestrel (MIRENA) 20 mcg/24 hour (5 years) IUD 1 Each by IntraUTERine route once. loratadine (CLARITIN) 10 mg tablet Take 10 mg by mouth daily.  Indications: ALLERGIC RHINITIS       No Known Allergies  Past Medical History:   Diagnosis Date    Allergic rhinitis 4/13/2011    Asthma     DDD (degenerative disc disease), lumbar     GERD (gastroesophageal reflux disease)     herniated disk    Hyperlipidemia     Irritable bowel syndrome with both constipation and diarrhea 2016    Obesity     Superficial thrombophlebitis of arm 12/15/2014    right arm after lab draw     Past Surgical History:   Procedure Laterality Date    HX  SECTION  ;     HX LAP CHOLECYSTECTOMY  12/10/2019    HX WISDOM TEETH EXTRACTION  2002     Family History   Problem Relation Age of Onset    Hypertension Father     Prostate Cancer Father     Hypertension Maternal Grandmother     Diabetes Maternal Grandfather     Hypertension Paternal Grandmother     No Known Problems Mother     No Known Problems Sister     No Known Problems Brother     No Known Problems Daughter     No Known Problems Son      Social History     Tobacco Use    Smoking status: Never    Smokeless tobacco: Never   Substance Use Topics    Alcohol use: No       ROS - per HPI      Objective:   No flowsheet data found. General: alert, fatigued, cooperative, no distress   Mental  status: normal mood, behavior, speech, dress, motor activity, and thought processes, able to follow commands   Eyes: EOM intact, normal sclera   Mouth: mucous membranes moist   Neck: no visualized mass   Resp: normal effort and no respiratory distress   Neuro: no gross deficits   Musculoskeletal: normal ROM of neck   Skin: no discoloration or lesions of concern on visible areas   Psychiatric: normal affect, no hallucinations     [employer], was evaluated through a synchronous (real-time) audio-video encounter. The patient (or guardian if applicable) is aware that this is a billable service. Verbal consent to proceed has been obtained within the past 12 months. The visit was conducted pursuant to the emergency declaration under the [address] [place] [address], [address] [place] [address] authority and the [employer] Act. Patient identification was verified, and a caregiver was present when appropriate.  The patient was located in a state where the provider was credentialed to provide care.      Sarah Welch, NP

## 2023-05-18 RX ORDER — FLUTICASONE PROPIONATE 50 MCG
2 SPRAY, SUSPENSION (ML) NASAL DAILY
COMMUNITY
Start: 2018-03-28

## 2023-05-18 RX ORDER — MELOXICAM 15 MG/1
15 TABLET ORAL DAILY PRN
COMMUNITY
Start: 2020-12-02

## 2023-05-18 RX ORDER — ALBUTEROL SULFATE 90 UG/1
1 AEROSOL, METERED RESPIRATORY (INHALATION) PRN
COMMUNITY
Start: 2018-03-28

## 2023-05-18 RX ORDER — CLOTRIMAZOLE AND BETAMETHASONE DIPROPIONATE 10; .64 MG/G; MG/G
CREAM TOPICAL 2 TIMES DAILY
COMMUNITY
Start: 2020-08-24

## 2023-05-18 RX ORDER — MONTELUKAST SODIUM 10 MG/1
10 TABLET ORAL DAILY
COMMUNITY
Start: 2018-03-28

## 2023-05-18 RX ORDER — COVID-19 ANTIGEN TEST
1 KIT MISCELLANEOUS 2 TIMES DAILY PRN
COMMUNITY

## 2023-05-18 RX ORDER — OMEPRAZOLE 20 MG/1
20 CAPSULE, DELAYED RELEASE ORAL DAILY
COMMUNITY
Start: 2018-07-06

## 2023-05-18 RX ORDER — LORATADINE 10 MG/1
10 TABLET ORAL DAILY
COMMUNITY

## 2024-07-19 ENCOUNTER — TELEPHONE (OUTPATIENT)
Age: 44
End: 2024-07-19

## 2024-07-19 NOTE — TELEPHONE ENCOUNTER
Pulmonary note reviewd (Dr Bocanegra).  She has not been seen by me in almost 3 yrs. If she wishes to remain a patient in our office needs to call to schedule CPE.  Can get scheduled in 2-3 months.

## 2024-08-16 ENCOUNTER — TELEPHONE (OUTPATIENT)
Age: 44
End: 2024-08-16

## 2024-08-16 NOTE — TELEPHONE ENCOUNTER
----- Message from Kiki LEONE sent at 8/16/2024 11:31 AM EDT -----  Regarding: [facility] Appointment Request  [facility] Appointment Request    Patient needs appointment for [facility] Appointment Type: Annual Visit.    Patient Requested Dates(s): Any possible date or next couple days probably August 19 or 20, 2024   Patient Requested Time: wants to schedule it on the morning appointment   Provider Name:Scout Odom MD    Reason for Appointment Request: Established Patient - No appointments available during search  --------------------------------------------------------------------------------------------------------------------------    Relationship to Patient: Self     Call Back Information: OK to leave message on [employer]  Preferred Call Back Number: Phone  [phone]         Note: The patient wants to reschedule her appointment on 9/13/2024 at 9:20 AM and this is a physical visit appointment she  wants to move it on any day if there is an open possible schedule and for morning appointment is the one she preferred.

## 2024-11-12 ENCOUNTER — OFFICE VISIT (OUTPATIENT)
Age: 44
End: 2024-11-12
Payer: OTHER GOVERNMENT

## 2024-11-12 VITALS
HEIGHT: 66 IN | BODY MASS INDEX: 37.54 KG/M2 | WEIGHT: 233.6 LBS | TEMPERATURE: 97.4 F | HEART RATE: 89 BPM | DIASTOLIC BLOOD PRESSURE: 85 MMHG | OXYGEN SATURATION: 98 % | SYSTOLIC BLOOD PRESSURE: 134 MMHG | RESPIRATION RATE: 16 BRPM

## 2024-11-12 DIAGNOSIS — Z00.00 ROUTINE PHYSICAL EXAMINATION: Primary | ICD-10-CM

## 2024-11-12 DIAGNOSIS — G89.29 CHRONIC UPPER BACK PAIN: ICD-10-CM

## 2024-11-12 DIAGNOSIS — J18.9 COMMUNITY ACQUIRED PNEUMONIA OF RIGHT UPPER LOBE OF LUNG: ICD-10-CM

## 2024-11-12 DIAGNOSIS — G89.29 CHRONIC MIDLINE LOW BACK PAIN WITHOUT SCIATICA: ICD-10-CM

## 2024-11-12 DIAGNOSIS — J45.20 MILD INTERMITTENT ASTHMA WITHOUT COMPLICATION: ICD-10-CM

## 2024-11-12 DIAGNOSIS — M54.9 CHRONIC UPPER BACK PAIN: ICD-10-CM

## 2024-11-12 DIAGNOSIS — E66.01 MORBID OBESITY: ICD-10-CM

## 2024-11-12 DIAGNOSIS — M54.50 CHRONIC MIDLINE LOW BACK PAIN WITHOUT SCIATICA: ICD-10-CM

## 2024-11-12 DIAGNOSIS — K21.9 GASTROESOPHAGEAL REFLUX DISEASE, UNSPECIFIED WHETHER ESOPHAGITIS PRESENT: ICD-10-CM

## 2024-11-12 PROBLEM — M54.41 CHRONIC MIDLINE LOW BACK PAIN WITH BILATERAL SCIATICA: Status: RESOLVED | Noted: 2021-11-17 | Resolved: 2024-11-12

## 2024-11-12 PROBLEM — M54.42 CHRONIC MIDLINE LOW BACK PAIN WITH BILATERAL SCIATICA: Status: RESOLVED | Noted: 2021-11-17 | Resolved: 2024-11-12

## 2024-11-12 PROCEDURE — 99396 PREV VISIT EST AGE 40-64: CPT | Performed by: INTERNAL MEDICINE

## 2024-11-12 RX ORDER — FERROUS SULFATE 325(65) MG
325 TABLET, DELAYED RELEASE (ENTERIC COATED) ORAL
COMMUNITY

## 2024-11-12 RX ORDER — PANTOPRAZOLE SODIUM 40 MG/1
40 TABLET, DELAYED RELEASE ORAL DAILY
COMMUNITY

## 2024-11-12 RX ORDER — ELECTROLYTES/DEXTROSE
1 SOLUTION, ORAL ORAL DAILY
COMMUNITY

## 2024-11-12 SDOH — ECONOMIC STABILITY: FOOD INSECURITY: WITHIN THE PAST 12 MONTHS, YOU WORRIED THAT YOUR FOOD WOULD RUN OUT BEFORE YOU GOT MONEY TO BUY MORE.: NEVER TRUE

## 2024-11-12 SDOH — ECONOMIC STABILITY: INCOME INSECURITY: HOW HARD IS IT FOR YOU TO PAY FOR THE VERY BASICS LIKE FOOD, HOUSING, MEDICAL CARE, AND HEATING?: NOT HARD AT ALL

## 2024-11-12 SDOH — ECONOMIC STABILITY: FOOD INSECURITY: WITHIN THE PAST 12 MONTHS, THE FOOD YOU BOUGHT JUST DIDN'T LAST AND YOU DIDN'T HAVE MONEY TO GET MORE.: NEVER TRUE

## 2024-11-12 ASSESSMENT — PATIENT HEALTH QUESTIONNAIRE - PHQ9
1. LITTLE INTEREST OR PLEASURE IN DOING THINGS: NOT AT ALL
2. FEELING DOWN, DEPRESSED OR HOPELESS: NOT AT ALL
SUM OF ALL RESPONSES TO PHQ QUESTIONS 1-9: 0
SUM OF ALL RESPONSES TO PHQ9 QUESTIONS 1 & 2: 0
SUM OF ALL RESPONSES TO PHQ QUESTIONS 1-9: 0

## 2024-11-12 ASSESSMENT — LIFESTYLE VARIABLES
HOW OFTEN DO YOU HAVE A DRINK CONTAINING ALCOHOL: NEVER
HOW MANY STANDARD DRINKS CONTAINING ALCOHOL DO YOU HAVE ON A TYPICAL DAY: PATIENT DOES NOT DRINK

## 2024-11-12 ASSESSMENT — ENCOUNTER SYMPTOMS
SHORTNESS OF BREATH: 1
NAUSEA: 0
BLOOD IN STOOL: 0
DIARRHEA: 0
ABDOMINAL PAIN: 0
CONSTIPATION: 0
VOMITING: 0
COUGH: 1

## 2024-11-12 NOTE — ASSESSMENT & PLAN NOTE
Chronic, stable, using NSAID's with good relief.  Monitored by specialist- no acute findings meriting change in the plan

## 2024-11-12 NOTE — ASSESSMENT & PLAN NOTE
Normally well controlled, worse currently.  Possibly due to doxycycline she was on for pneumonia. Will continue w/ current medications, can add in Tums/Rolaids for now.  Red flags and expectations reviewed.

## 2024-11-12 NOTE — ASSESSMENT & PLAN NOTE
Worsening since last visit, but she reports stable for the last year.  It is above goal and needs improvement. I have recommended the following interventions: dietary management education, guidance, and counseling and encourage exercise.  We discussed medications options and declined, but will consider.

## 2024-11-12 NOTE — PROGRESS NOTES
Carlita Cervantes is a 44 y.o. female who was seen in clinic today (11/12/2024) for a full physical.  She was last seen by me on 11/17/21.    Assessment & Plan:   Below is the assessment and plan developed based on review of pertinent history, physical exam, labs, studies, and medications.    1. Routine physical examination  -     Comprehensive Metabolic Panel; Future  -     CBC; Future  -     Lipid Panel; Future  -     TSH; Future  2. Community acquired pneumonia of right upper lobe of lung  Comments:  new dx, is improving but not back to normal. Reviewed expectations. Follow up CXR ordered thru [facility]. Red flags & expectations reviewed.  3. Mild intermittent asthma without complication  Assessment & Plan:  Currently asymptomatic. Monitored by specialist- no acute findings meriting change in the plan  4. Gastroesophageal reflux disease, unspecified whether esophagitis present  Assessment & Plan:  Normally well controlled, worse currently.  Possibly due to doxycycline she was on for pneumonia. Will continue w/ current medications, can add in Tums/Rolaids for now.  Red flags and expectations reviewed.     5. Morbid obesity  Assessment & Plan:  Worsening since last visit, but she reports stable for the last year.  It is above goal and needs improvement. I have recommended the following interventions: dietary management education, guidance, and counseling and encourage exercise.  We discussed medications options and declined, but will consider.  6. Chronic upper back pain  Assessment & Plan:  Chronic, stable, using NSAID's with good relief. Monitored by specialist- no acute findings meriting change in the plan  7. Chronic midline low back pain without sciatica  Assessment & Plan:  Chronic, stable, using NSAID's with good relief.  Monitored by specialist- no acute findings meriting change in the plan     Return in about 1 year (around 11/12/2025) for FULL PHYSICAL.   Subjective:   Carlita is here today for a full physical.

## 2024-11-13 DIAGNOSIS — Z00.00 ROUTINE PHYSICAL EXAMINATION: ICD-10-CM

## 2024-11-13 LAB
ALBUMIN SERPL-MCNC: 3.6 G/DL (ref 3.5–5)
ALBUMIN/GLOB SERPL: 1.2 (ref 1.1–2.2)
ALP SERPL-CCNC: 105 U/L (ref 45–117)
ALT SERPL-CCNC: 44 U/L (ref 12–78)
ANION GAP SERPL CALC-SCNC: 4 MMOL/L (ref 2–12)
AST SERPL-CCNC: 15 U/L (ref 15–37)
BILIRUB SERPL-MCNC: 0.9 MG/DL (ref 0.2–1)
BUN SERPL-MCNC: 11 MG/DL (ref 6–20)
BUN/CREAT SERPL: 14 (ref 12–20)
CALCIUM SERPL-MCNC: 8.3 MG/DL (ref 8.5–10.1)
CHLORIDE SERPL-SCNC: 105 MMOL/L (ref 97–108)
CHOLEST SERPL-MCNC: 208 MG/DL
CO2 SERPL-SCNC: 31 MMOL/L (ref 21–32)
CREAT SERPL-MCNC: 0.81 MG/DL (ref 0.55–1.02)
ERYTHROCYTE [DISTWIDTH] IN BLOOD BY AUTOMATED COUNT: 13 % (ref 11.5–14.5)
GLOBULIN SER CALC-MCNC: 3.1 G/DL (ref 2–4)
GLUCOSE SERPL-MCNC: 98 MG/DL (ref 65–100)
HCT VFR BLD AUTO: 40.1 % (ref 35–47)
HDLC SERPL-MCNC: 56 MG/DL
HDLC SERPL: 3.7 (ref 0–5)
HGB BLD-MCNC: 13 G/DL (ref 11.5–16)
LDLC SERPL CALC-MCNC: 132.4 MG/DL (ref 0–100)
MCH RBC QN AUTO: 29.9 PG (ref 26–34)
MCHC RBC AUTO-ENTMCNC: 32.4 G/DL (ref 30–36.5)
MCV RBC AUTO: 92.2 FL (ref 80–99)
NRBC # BLD: 0 K/UL (ref 0–0.01)
NRBC BLD-RTO: 0 PER 100 WBC
PLATELET # BLD AUTO: 312 K/UL (ref 150–400)
PMV BLD AUTO: 9.2 FL (ref 8.9–12.9)
POTASSIUM SERPL-SCNC: 4.2 MMOL/L (ref 3.5–5.1)
PROT SERPL-MCNC: 6.7 G/DL (ref 6.4–8.2)
RBC # BLD AUTO: 4.35 M/UL (ref 3.8–5.2)
SODIUM SERPL-SCNC: 140 MMOL/L (ref 136–145)
TRIGL SERPL-MCNC: 98 MG/DL
TSH SERPL DL<=0.05 MIU/L-ACNC: 1.49 UIU/ML (ref 0.36–3.74)
VLDLC SERPL CALC-MCNC: 19.6 MG/DL
WBC # BLD AUTO: 8.9 K/UL (ref 3.6–11)

## 2024-11-13 NOTE — RESULT ENCOUNTER NOTE
Results released to patient via [employer].  All labs are stable or at goal for her.  Ca is low but corrects.  The 10-year ASCVD risk score (Paulo LEON, et al., 2019) is: 1.1%

## 2025-01-31 ENCOUNTER — HOSPITAL ENCOUNTER (OUTPATIENT)
Facility: HOSPITAL | Age: 45
Discharge: HOME | End: 2025-01-31
Attending: INTERNAL MEDICINE
Payer: OTHER GOVERNMENT

## 2025-01-31 DIAGNOSIS — K21.9 CHRONIC GASTROESOPHAGEAL REFLUX DISEASE: ICD-10-CM

## 2025-01-31 PROCEDURE — 74220 X-RAY XM ESOPHAGUS 1CNTRST: CPT

## (undated) DEVICE — SURGICAL PROCEDURE KIT GEN LAPAROSCOPY LF

## (undated) DEVICE — FILTER SMK EVAC FLO CLR MEGADYNE

## (undated) DEVICE — TUBING INSUF 0.3UM FLTR W/ LUERLOCK CONN

## (undated) DEVICE — BAG SPEC REM 224ML W4XL6IN DIA10MM 1 HND GYN DISP ENDOPCH

## (undated) DEVICE — REM POLYHESIVE ADULT PATIENT RETURN ELECTRODE: Brand: VALLEYLAB

## (undated) DEVICE — CANISTER, RIGID, 3000CC: Brand: MEDLINE INDUSTRIES, INC.

## (undated) DEVICE — STERILE POLYISOPRENE POWDER-FREE SURGICAL GLOVES WITH EMOLLIENT COATING: Brand: PROTEXIS

## (undated) DEVICE — (D)PREP SKN CHLRAPRP APPL 26ML -- CONVERT TO ITEM 371833

## (undated) DEVICE — NEEDLE HYPO 22GA L1.5IN BLK S STL HUB POLYPR SHLD REG BVL

## (undated) DEVICE — GARMENT,MEDLINE,DVT,INT,CALF,MED, GEN2: Brand: MEDLINE

## (undated) DEVICE — INFECTION CONTROL KIT SYS

## (undated) DEVICE — TROCAR: Brand: KII® SLEEVE

## (undated) DEVICE — DISSECTOR RMFG CURVED 5MM --

## (undated) DEVICE — INSUFFLATION NEEDLE: Brand: SURGINEEDLE

## (undated) DEVICE — CLICKLINE SCISSORS INSERT: Brand: CLICKLINE

## (undated) DEVICE — LAPAROSCOPIC TROCAR SLEEVE/SINGLE USE: Brand: KII® OPTICAL ACCESS SYSTEM

## (undated) DEVICE — PMI OPERATIVE CHOLANGIOGRAM CATHETER; TUBING IS 76CM IN LENGTH, 16GA WITH A: Brand: PMI

## (undated) DEVICE — APPLIER CLP L SHFT DIA12MM 20 ROT MULT LIGACLP

## (undated) DEVICE — APPLICATOR BNDG 1MM ADH PREMIERPRO EXOFIN

## (undated) DEVICE — ELECTRODE ES 36CM LAP FLAT L HK COAT DISP CLEANCOAT

## (undated) DEVICE — TROCAR: Brand: KII® OPTICAL ACCESS SYSTEM

## (undated) DEVICE — SUTURE MCRYL SZ 4-0 L27IN ABSRB UD L19MM PS-2 1/2 CIR PRIM Y426H

## (undated) DEVICE — SUTURE SZ 0 27IN 5/8 CIR UR-6  TAPER PT VIOLET ABSRB VICRYL J603H

## (undated) DEVICE — STRAP,POSITIONING,KNEE/BODY,FOAM,4X60": Brand: MEDLINE

## (undated) DEVICE — DRAPE,UTILTY,TAPE,15X26, 4EA/PK: Brand: MEDLINE